# Patient Record
Sex: FEMALE | Race: ASIAN | URBAN - METROPOLITAN AREA
[De-identification: names, ages, dates, MRNs, and addresses within clinical notes are randomized per-mention and may not be internally consistent; named-entity substitution may affect disease eponyms.]

---

## 2021-03-17 ENCOUNTER — APPOINTMENT (RX ONLY)
Dept: URBAN - METROPOLITAN AREA CLINIC 28 | Facility: CLINIC | Age: 36
Setting detail: DERMATOLOGY
End: 2021-03-17

## 2021-03-17 DIAGNOSIS — L65.0 TELOGEN EFFLUVIUM: ICD-10-CM

## 2021-03-17 PROCEDURE — ? PRESCRIPTION

## 2021-03-17 PROCEDURE — ? PRESCRIPTION MEDICATION MANAGEMENT

## 2021-03-17 PROCEDURE — ? MEDICATION COUNSELING

## 2021-03-17 PROCEDURE — ? ORDER TESTS

## 2021-03-17 PROCEDURE — 99204 OFFICE O/P NEW MOD 45 MIN: CPT

## 2021-03-17 PROCEDURE — ? COUNSELING

## 2021-03-17 RX ORDER — CLOBETASOL PROPIONATE 0.5 MG/ML
SOLUTION TOPICAL QHS
Qty: 1 | Refills: 1 | Status: ERX | COMMUNITY
Start: 2021-03-17

## 2021-03-17 RX ADMIN — CLOBETASOL PROPIONATE: 0.5 SOLUTION TOPICAL at 00:00

## 2021-03-17 ASSESSMENT — LOCATION SIMPLE DESCRIPTION DERM: LOCATION SIMPLE: SCALP

## 2021-03-17 ASSESSMENT — LOCATION ZONE DERM: LOCATION ZONE: SCALP

## 2021-03-17 ASSESSMENT — LOCATION DETAILED DESCRIPTION DERM: LOCATION DETAILED: RIGHT SUPERIOR PARIETAL SCALP

## 2021-03-17 NOTE — PROCEDURE: MEDICATION COUNSELING
Xelemz Pregnancy And Lactation Text: This medication is Pregnancy Category D and is not considered safe during pregnancy.  The risk during breast feeding is also uncertain.

## 2021-03-17 NOTE — PROCEDURE: ORDER TESTS
Expected Date Of Service: 03/17/2021
Performing Laboratory: [id number]
Bill For Surgical Tray: no
Billing Type: Third-Party Bill

## 2021-03-17 NOTE — PROCEDURE: PRESCRIPTION MEDICATION MANAGEMENT
Detail Level: Zone
Initiate Treatment: clobetasol 0.05 % scalp solution: apply scattered drops to scalp qhs
Render In Strict Bullet Format?: No

## 2023-05-15 ENCOUNTER — APPOINTMENT (EMERGENCY)
Dept: RADIOLOGY | Facility: HOSPITAL | Age: 38
End: 2023-05-15
Payer: COMMERCIAL

## 2023-05-15 ENCOUNTER — HOSPITAL ENCOUNTER (EMERGENCY)
Facility: HOSPITAL | Age: 38
Discharge: HOME | End: 2023-05-15
Attending: STUDENT IN AN ORGANIZED HEALTH CARE EDUCATION/TRAINING PROGRAM
Payer: COMMERCIAL

## 2023-05-15 VITALS
TEMPERATURE: 98.9 F | SYSTOLIC BLOOD PRESSURE: 102 MMHG | DIASTOLIC BLOOD PRESSURE: 55 MMHG | RESPIRATION RATE: 16 BRPM | OXYGEN SATURATION: 99 % | HEART RATE: 79 BPM

## 2023-05-15 DIAGNOSIS — O20.0 ABORTION, THREATENED: Primary | ICD-10-CM

## 2023-05-15 LAB
ABO + RH BLD: NORMAL
ALBUMIN SERPL-MCNC: 4 G/DL (ref 3.4–5)
ALP SERPL-CCNC: 39 IU/L (ref 35–126)
ALT SERPL-CCNC: 14 IU/L (ref 11–54)
ANION GAP SERPL CALC-SCNC: 8 MEQ/L (ref 3–15)
AST SERPL-CCNC: 14 IU/L (ref 15–41)
BACTERIA URNS QL MICRO: ABNORMAL /HPF
BASOPHILS # BLD: 0.03 K/UL (ref 0.01–0.1)
BASOPHILS NFR BLD: 0.4 %
BILIRUB SERPL-MCNC: 0.5 MG/DL (ref 0.3–1.2)
BILIRUB UR QL STRIP.AUTO: NEGATIVE MG/DL
BLD GP AB SCN SERPL QL: NEGATIVE
BUN SERPL-MCNC: 11 MG/DL (ref 8–20)
CALCIUM SERPL-MCNC: 9.2 MG/DL (ref 8.9–10.3)
CHLORIDE SERPL-SCNC: 104 MEQ/L (ref 98–109)
CLARITY UR REFRACT.AUTO: CLEAR
CO2 SERPL-SCNC: 24 MEQ/L (ref 22–32)
COLOR UR AUTO: YELLOW
CREAT SERPL-MCNC: 0.6 MG/DL (ref 0.6–1.1)
D AG BLD QL: POSITIVE
DIFFERENTIAL METHOD BLD: NORMAL
EOSINOPHIL # BLD: 0.09 K/UL (ref 0.04–0.36)
EOSINOPHIL NFR BLD: 1.2 %
ERYTHROCYTE [DISTWIDTH] IN BLOOD BY AUTOMATED COUNT: 12.3 % (ref 11.7–14.4)
GFR SERPL CREATININE-BSD FRML MDRD: >60 ML/MIN/1.73M*2
GLUCOSE SERPL-MCNC: 117 MG/DL (ref 70–99)
GLUCOSE UR STRIP.AUTO-MCNC: NEGATIVE MG/DL
HCG SERPL-ACNC: 5308 IU/L (MIU/ML)
HCT VFR BLDCO AUTO: 37.3 % (ref 35–45)
HGB BLD-MCNC: 12.6 G/DL (ref 11.8–15.7)
HGB UR QL STRIP.AUTO: 3
HYALINE CASTS #/AREA URNS LPF: ABNORMAL /LPF
IMM GRANULOCYTES # BLD AUTO: 0.02 K/UL (ref 0–0.08)
IMM GRANULOCYTES NFR BLD AUTO: 0.3 %
KETONES UR STRIP.AUTO-MCNC: NEGATIVE MG/DL
LABORATORY COMMENT REPORT: NORMAL
LEUKOCYTE ESTERASE UR QL STRIP.AUTO: ABNORMAL
LYMPHOCYTES # BLD: 2.43 K/UL (ref 1.2–3.5)
LYMPHOCYTES NFR BLD: 31.8 %
MCH RBC QN AUTO: 30.9 PG (ref 28–33.2)
MCHC RBC AUTO-ENTMCNC: 33.8 G/DL (ref 32.2–35.5)
MCV RBC AUTO: 91.4 FL (ref 83–98)
MONOCYTES # BLD: 0.52 K/UL (ref 0.28–0.8)
MONOCYTES NFR BLD: 6.8 %
MUCOUS THREADS URNS QL MICRO: ABNORMAL /LPF
NEUTROPHILS # BLD: 4.55 K/UL (ref 1.7–7)
NEUTS SEG NFR BLD: 59.5 %
NITRITE UR QL STRIP.AUTO: NEGATIVE
NRBC BLD-RTO: 0 %
PDW BLD AUTO: 10.2 FL (ref 9.4–12.3)
PH UR STRIP.AUTO: 6.5 [PH]
PLATELET # BLD AUTO: 330 K/UL (ref 150–369)
POTASSIUM SERPL-SCNC: 3.6 MEQ/L (ref 3.6–5.1)
PROT SERPL-MCNC: 7.4 G/DL (ref 6–8.2)
PROT UR QL STRIP.AUTO: NEGATIVE
RBC # BLD AUTO: 4.08 M/UL (ref 3.93–5.22)
RBC #/AREA URNS HPF: ABNORMAL /HPF
SODIUM SERPL-SCNC: 136 MEQ/L (ref 136–144)
SP GR UR REFRACT.AUTO: 1.02
SPECIMEN EXP DATE BLD: NORMAL
SQUAMOUS URNS QL MICRO: ABNORMAL /HPF
UROBILINOGEN UR STRIP-ACNC: 0.2 EU/DL
WBC # BLD AUTO: 7.64 K/UL (ref 3.8–10.5)
WBC #/AREA URNS HPF: ABNORMAL /HPF

## 2023-05-15 PROCEDURE — 36415 COLL VENOUS BLD VENIPUNCTURE: CPT

## 2023-05-15 PROCEDURE — 80053 COMPREHEN METABOLIC PANEL: CPT | Performed by: STUDENT IN AN ORGANIZED HEALTH CARE EDUCATION/TRAINING PROGRAM

## 2023-05-15 PROCEDURE — 93975 VASCULAR STUDY: CPT

## 2023-05-15 PROCEDURE — 85025 COMPLETE CBC W/AUTO DIFF WBC: CPT

## 2023-05-15 PROCEDURE — 81001 URINALYSIS AUTO W/SCOPE: CPT

## 2023-05-15 PROCEDURE — 76817 TRANSVAGINAL US OBSTETRIC: CPT

## 2023-05-15 PROCEDURE — 76815 OB US LIMITED FETUS(S): CPT

## 2023-05-15 PROCEDURE — 86901 BLOOD TYPING SEROLOGIC RH(D): CPT

## 2023-05-15 PROCEDURE — 84702 CHORIONIC GONADOTROPIN TEST: CPT | Performed by: STUDENT IN AN ORGANIZED HEALTH CARE EDUCATION/TRAINING PROGRAM

## 2023-05-15 PROCEDURE — 99284 EMERGENCY DEPT VISIT MOD MDM: CPT

## 2023-05-15 PROCEDURE — 85025 COMPLETE CBC W/AUTO DIFF WBC: CPT | Performed by: STUDENT IN AN ORGANIZED HEALTH CARE EDUCATION/TRAINING PROGRAM

## 2023-05-15 ASSESSMENT — ENCOUNTER SYMPTOMS
BACK PAIN: 1
PALPITATIONS: 0
NAUSEA: 0
FREQUENCY: 0
FLANK PAIN: 0
FEVER: 0
HEMATURIA: 0
ABDOMINAL PAIN: 1
LIGHT-HEADEDNESS: 0
SHORTNESS OF BREATH: 0
VOMITING: 0
DYSURIA: 0
WEAKNESS: 0
DIZZINESS: 0
CHILLS: 0

## 2023-05-16 NOTE — ED PROVIDER NOTES
Emergency Medicine Note  HPI   HISTORY OF PRESENT ILLNESS     Patient is a 37-year-old  female who is currently 10 weeks pregnant presenting for vaginal bleeding and lower abdominal cramping.  Patient reports that for the past 3 days, she has noticed occasional brown spotting in her underwear that is now progressed to bright red blood without clots.  She denies saturation of any pads.  With the spotting, she also notes lower abdominal cramping that has been constant over the past 3 days that radiates to her lower back.  She reports that she was told by her OB/GYN that brown spotting can occur but the patient became more concerned that the color of the spotting has become more red.  She currently sees OB/GYN at [facility].  She states that she had an ultrasound last week which was normal.  Her last menstrual period was .  She denies any fever, chills, chest pain, shortness of breath, nausea, vomiting, dizziness, lightheadedness, palpitations, weakness, syncope.            Patient History   PAST HISTORY     Reviewed from Nursing Triage:       History reviewed. No pertinent past medical history.    No past surgical history on file.    History reviewed. No pertinent family history.           Review of Systems   REVIEW OF SYSTEMS     Review of Systems   Constitutional: Negative for chills and fever.   Respiratory: Negative for shortness of breath.    Cardiovascular: Negative for chest pain and palpitations.   Gastrointestinal: Positive for abdominal pain (lower abdominal cramping). Negative for nausea and vomiting.   Genitourinary: Positive for vaginal bleeding. Negative for dysuria, flank pain, frequency, hematuria, pelvic pain and urgency.   Musculoskeletal: Positive for back pain (radiation of abdominal pain).   Neurological: Negative for dizziness, syncope, weakness and light-headedness.         VITALS     ED Vitals    Date/Time Temp Pulse Resp BP SpO2 [facility]   05/15/23 2157 -- 79  16 102/55 99 %    05/15/23 1852 37.2 °C (98.9 °F) 99 18 106/60 100 % GC        Pulse Ox %: 100 % (05/15/23 1852)  Pulse Ox Interpretation: Normal (05/15/23 1852)           Physical Exam   PHYSICAL EXAM     Physical Exam  Exam conducted with a chaperone present.   Constitutional:       General: She is not in acute distress.     Appearance: Normal appearance. She is not ill-appearing, toxic-appearing or diaphoretic.   HENT:      Head: Normocephalic and atraumatic.      Mouth/Throat:      Mouth: Mucous membranes are moist.      Pharynx: Oropharynx is clear.   Eyes:      Extraocular Movements: Extraocular movements intact.      Conjunctiva/sclera: Conjunctivae normal.      Pupils: Pupils are equal, round, and reactive to light.   Cardiovascular:      Rate and Rhythm: Normal rate and regular rhythm.   Pulmonary:      Effort: Pulmonary effort is normal. No respiratory distress.      Breath sounds: Normal breath sounds. No wheezing, rhonchi or rales.   Abdominal:      General: Abdomen is flat.      Palpations: Abdomen is soft.      Tenderness: There is abdominal tenderness (mild) in the right lower quadrant, suprapubic area and left lower quadrant. There is no right CVA tenderness, left CVA tenderness, guarding or rebound. Negative signs include Vazquez's sign, Rovsing's sign and McBurney's sign.   Genitourinary:     Vagina: Bleeding present. No vaginal discharge, erythema or tenderness.      Cervix: Cervical bleeding present. No cervical motion tenderness, discharge, friability, lesion or erythema.      Uterus: Not tender.       Adnexa:         Right: No tenderness.          Left: No tenderness.        Comments: Scant amount of old blood present within vaginal vault, no active bleeding; closed os  Musculoskeletal:      Cervical back: Normal range of motion and neck supple.   Skin:     General: Skin is warm and dry.      Capillary Refill: Capillary refill takes less than 2 seconds.   Neurological:      Mental Status: She  is alert and oriented to person, place, and time.           PROCEDURES     Procedures     DATA     Results     Procedure Component Value Units Date/Time    UA with reflex culture [[id number]]  (Abnormal) Collected: 05/15/23 2104    Specimen: Urine, Clean Catch Updated: 05/15/23 2124    Narrative:      The following orders were created for panel order UA with reflex culture.  Procedure                               Abnormality         Status                     ---------                               -----------         ------                     UA Reflex to Culture (Ma...[[id number]]  Abnormal            Final result               UA Microscopic[[id number]]               Abnormal            Final result                 Please view results for these tests on the individual orders.    UA Microscopic [[id number]]  (Abnormal) Collected: 05/15/23 2104    Specimen: Urine, Clean Catch Updated: 05/15/23 2124     RBC, Urine 5 TO 9 /HPF      WBC, Urine 0 TO 3 /HPF      Squamous Epithelial Rare /hpf      Hyaline Cast None Seen /lpf      Bacteria, Urine Rare /HPF      Mucus Rare /LPF     UA Reflex to Culture (Macroscopic) [[id number]]  (Abnormal) Collected: 05/15/23 2104    Specimen: Urine, Clean Catch Updated: 05/15/23 2122     Color, Urine Yellow     Clarity, Urine Clear     Specific Gravity, Urine 1.020     pH, Urine 6.5     Leukocyte Esterase Trace     Nitrite, Urine Negative     Protein, Urine Negative     Glucose, Urine Negative mg/dL      Ketones, Urine Negative mg/dL      Urobilinogen, Urine 0.2 EU/dL      Bilirubin, Urine Negative mg/dL      Blood, Urine +3     Comment: The sensitivity of the occult blood test is equivalent to approximately 4 intact RBC/HPF.       Type and screen (only if bleeding) [[id number]] Collected: 05/15/23 1856    Specimen: Blood, Venous Updated: 05/15/23 2009     Specimen Expiration 05/18/2023     Antibody Screen Negative     ABO A     Rh Factor Positive     History Check No type on file    [facility]  Serum, Quant [[id number]]  (Abnormal) Collected: 05/15/23 1856    Specimen: Blood, Venous Updated: 05/15/23 2002     hCG Quant 5,308.0 IU/L (mIU/mL)      Comment: Approx. Gestational Age   Approx. hCG Range         (Weeks)                  (IU/L)          0.2-1                    5-50            1-2                               2-3                  100-5000            3-4                  500-10,000            4-5                 1000-50,000            5-6               10,000-100,000            6-8               15,000-200,000            8-12              10,000-100,000       Comprehensive metabolic panel [[id number]]  (Abnormal) Collected: 05/15/23 1856    Specimen: Blood, Venous Updated: 05/15/23 1957     Sodium 136 mEQ/L      Potassium 3.6 mEQ/L      Comment: Results obtained on plasma. Plasma Potassium values may be up to 0.4 mEQ/L less than serum values. The differences may be greater for patients with high platelet or white cell counts.        Chloride 104 mEQ/L      CO2 24 mEQ/L      BUN 11 mg/dL      Creatinine 0.6 mg/dL      Glucose 117 mg/dL      Calcium 9.2 mg/dL      AST (SGOT) 14 IU/L      ALT (SGPT) 14 IU/L      Alkaline Phosphatase 39 IU/L      Total Protein 7.4 g/dL      Comment: Test performed on plasma which typically contains approximately 0.4 g/dL more protein than serum.        Albumin 4.0 g/dL      Bilirubin, Total 0.5 mg/dL      eGFR >60.0 mL/min/1.73m*2      Anion Gap 8 mEQ/L     CBC and differential [[id number]] Collected: 05/15/23 1856    Specimen: Blood, Venous Updated: 05/15/23 1904     WBC 7.64 K/uL      RBC 4.08 M/uL      Hemoglobin 12.6 g/dL      Hematocrit 37.3 %      MCV 91.4 fL      MCH 30.9 pg      MCHC 33.8 g/dL      RDW 12.3 %      Platelets 330 K/uL      MPV 10.2 fL      Differential Type Auto     nRBC 0.0 %      Immature Granulocytes 0.3 %      Neutrophils 59.5 %      Lymphocytes 31.8 %      Monocytes 6.8 %      Eosinophils 1.2 %      Basophils 0.4 %      Immature  Granulocytes, Absolute 0.02 K/uL      Neutrophils, Absolute 4.55 K/uL      Lymphocytes, Absolute 2.43 K/uL      Monocytes, Absolute 0.52 K/uL      Eosinophils, Absolute 0.09 K/uL      Basophils, Absolute 0.03 K/uL           Imaging Results          ULTRASOUND DOPPLER (Final result)  Result time 05/15/23 21:19:32    Final result                 Impression:    IMPRESSION:  1.  Gestational sac measures 3 cm which corresponds to a gestational age of  seven weeks and six days.  There is a possible fetal pole and yolk sac noted  with the crown-rump length measuring up to 0.3 cm which corresponds to  gestational age of five weeks, six days.  This represents a discrepancy with the  patient's LMP with gestational age of 10 weeks, six days.  There is also blood  products noted in the lower uterine segment.  Findings could be secondary to a  spontaneous , or blighted ovum.  Short interval follow-up serial beta  hCG levels as well as follow-up pelvic ultrasound is recommended.  2.  No ovarian torsion.  3.  Right ovarian corpus luteum measures 1.6 cm.               Narrative:    CLINICAL HISTORY: Pregnant female presents with vaginal bleeding.  By LMP,  patient should be 10 weeks and six days.    COMMENT: Real-time ultrasound examination of the gravid uterus was performed.  There are no prior studies available for comparison.    Gravid uterus measures 8.4 x 6.6 x 7.3 cm.  Gestational sac is noted measuring  up to 3 cm which corresponds to gestational age of seven weeks and six days.  There is a questionable crown-rump noted measuring length noted measuring up to  0.3 cm which may correspond to gestational age of five weeks, six days.  There  is a possible yolk sac noted measuring up to 0.1 cm.  No fetal cardiac activity  is documented.  Right ovary measures 3.5 x 1.9 x 1.9 cm.  No right ovarian  torsion.  Right ovarian corpus luteum is noted measuring 1.6 x 1.4 x 1.6 cm.  Left ovary measures 2.4 x 1.7 x 1.4 cm.  No left  ovarian torsion.  There are  blood products noted in the lower uterine segment.  No free fluid in the pelvis.                               ULTRASOUND PREGNANCY < 14 WEEKS TRANSABDOMINAL LIMITED (Final result)  Result time 05/15/23 21:19:32    Final result                 Impression:    IMPRESSION:  1.  Gestational sac measures 3 cm which corresponds to a gestational age of  seven weeks and six days.  There is a possible fetal pole and yolk sac noted  with the crown-rump length measuring up to 0.3 cm which corresponds to  gestational age of five weeks, six days.  This represents a discrepancy with the  patient's LMP with gestational age of 10 weeks, six days.  There is also blood  products noted in the lower uterine segment.  Findings could be secondary to a  spontaneous , or blighted ovum.  Short interval follow-up serial beta  hCG levels as well as follow-up pelvic ultrasound is recommended.  2.  No ovarian torsion.  3.  Right ovarian corpus luteum measures 1.6 cm.               Narrative:    CLINICAL HISTORY: Pregnant female presents with vaginal bleeding.  By LMP,  patient should be 10 weeks and six days.    COMMENT: Real-time ultrasound examination of the gravid uterus was performed.  There are no prior studies available for comparison.    Gravid uterus measures 8.4 x 6.6 x 7.3 cm.  Gestational sac is noted measuring  up to 3 cm which corresponds to gestational age of seven weeks and six days.  There is a questionable crown-rump noted measuring length noted measuring up to  0.3 cm which may correspond to gestational age of five weeks, six days.  There  is a possible yolk sac noted measuring up to 0.1 cm.  No fetal cardiac activity  is documented.  Right ovary measures 3.5 x 1.9 x 1.9 cm.  No right ovarian  torsion.  Right ovarian corpus luteum is noted measuring 1.6 x 1.4 x 1.6 cm.  Left ovary measures 2.4 x 1.7 x 1.4 cm.  No left ovarian torsion.  There are  blood products noted in the lower uterine  segment.  No free fluid in the pelvis.                               ULTRASOUND PREGNANCY TRANSVAGINAL ONLY (Final result)  Result time 05/15/23 21:19:32    Final result                 Impression:    IMPRESSION:  1.  Gestational sac measures 3 cm which corresponds to a gestational age of  seven weeks and six days.  There is a possible fetal pole and yolk sac noted  with the crown-rump length measuring up to 0.3 cm which corresponds to  gestational age of five weeks, six days.  This represents a discrepancy with the  patient's LMP with gestational age of 10 weeks, six days.  There is also blood  products noted in the lower uterine segment.  Findings could be secondary to a  spontaneous , or blighted ovum.  Short interval follow-up serial beta  hCG levels as well as follow-up pelvic ultrasound is recommended.  2.  No ovarian torsion.  3.  Right ovarian corpus luteum measures 1.6 cm.               Narrative:    CLINICAL HISTORY: Pregnant female presents with vaginal bleeding.  By LMP,  patient should be 10 weeks and six days.    COMMENT: Real-time ultrasound examination of the gravid uterus was performed.  There are no prior studies available for comparison.    Gravid uterus measures 8.4 x 6.6 x 7.3 cm.  Gestational sac is noted measuring  up to 3 cm which corresponds to gestational age of seven weeks and six days.  There is a questionable crown-rump noted measuring length noted measuring up to  0.3 cm which may correspond to gestational age of five weeks, six days.  There  is a possible yolk sac noted measuring up to 0.1 cm.  No fetal cardiac activity  is documented.  Right ovary measures 3.5 x 1.9 x 1.9 cm.  No right ovarian  torsion.  Right ovarian corpus luteum is noted measuring 1.6 x 1.4 x 1.6 cm.  Left ovary measures 2.4 x 1.7 x 1.4 cm.  No left ovarian torsion.  There are  blood products noted in the lower uterine segment.  No free fluid in the pelvis.                                No orders to  display       Scoring tools                                  ED Course & MDM   MDM / ED COURSE / CLINICAL IMPRESSION / DISPO     Medical Decision Making  , threatened: acute illness or injury  Amount and/or Complexity of Data Reviewed  Independent Historian: spouse  Labs: ordered. Decision-making details documented in ED Course.  Radiology: ordered. Decision-making details documented in ED Course.          ED Course as of 05/16/23 0114   Mon May 15, 2023   1952 Hemoglobin: 12.6 [AB]    Rh Factor: Positive [AB]    hCG Quant(!): 5,308.0 [AB]    ULTRASOUND PREGNANCY < 14 WEEKS TRANSABDOMINAL LIMITED  IMPRESSION:  1.  Gestational sac measures 3 cm which corresponds to a gestational age of  seven weeks and six days.  There is a possible fetal pole and yolk sac noted  with the crown-rump length measuring up to 0.3 cm which corresponds to  gestational age of five weeks, six days.  This represents a discrepancy with the  patient's LMP with gestational age of 10 weeks, six days.  There is also blood  products noted in the lower uterine segment.  Findings could be secondary to a  spontaneous , or blighted ovum.  Short interval follow-up serial beta  hCG levels as well as follow-up pelvic ultrasound is recommended.  2.  No ovarian torsion.  3.  Right ovarian corpus luteum measures 1.6 cm. [AB]   Tue May 16, 2023   0111 Reviewed lab and imaging results with patient.  Patient hemodynamically stable with stable hemoglobin.  She will follow-up outpatient with OB/GYN for serial hCG levels in addition to repeat pelvic ultrasound outpatient along with following up with her PCP.  Strict return precautions discussed.  Patient and  expressed understanding and are agreeable with plan. [AB]      ED Course User Index  [AB] Macie Connor PA C     Clinical Impression      , threatened     _________________     ED Disposition   Discharge                   Macie Connor PA  C  05/16/23 0112       Macie Connor PA C  05/16/23 0114

## 2023-05-16 NOTE — DISCHARGE INSTRUCTIONS
Follow-up with OB/GYN regarding recent ER visit and for continued care.  Recommend beta hCG monitoring outpatient in addition to repeat ultrasound.    Follow-up with PCP regarding recent ER visit and for continued care.    Return to the ER for any new or worsening symptoms such as but not limited to fever, worsening pain, intractable nausea/vomiting, dizziness/lightheadedness, chest pain, shortness of breath, vaginal bleeding saturating a pad every 1-2 hours, or any other concerns.

## 2023-05-17 ENCOUNTER — APPOINTMENT (EMERGENCY)
Dept: RADIOLOGY | Facility: HOSPITAL | Age: 38
End: 2023-05-17
Payer: COMMERCIAL

## 2023-05-17 ENCOUNTER — HOSPITAL ENCOUNTER (OUTPATIENT)
Facility: HOSPITAL | Age: 38
Discharge: HOME | End: 2023-05-17
Attending: EMERGENCY MEDICINE | Admitting: STUDENT IN AN ORGANIZED HEALTH CARE EDUCATION/TRAINING PROGRAM
Payer: COMMERCIAL

## 2023-05-17 ENCOUNTER — ANESTHESIA (OUTPATIENT)
Dept: OPERATING ROOM | Facility: HOSPITAL | Age: 38
End: 2023-05-17
Payer: COMMERCIAL

## 2023-05-17 ENCOUNTER — ANESTHESIA EVENT (OUTPATIENT)
Dept: OPERATING ROOM | Facility: HOSPITAL | Age: 38
End: 2023-05-17
Payer: COMMERCIAL

## 2023-05-17 VITALS
DIASTOLIC BLOOD PRESSURE: 64 MMHG | HEART RATE: 104 BPM | TEMPERATURE: 97.2 F | BODY MASS INDEX: 23.32 KG/M2 | RESPIRATION RATE: 25 BRPM | HEIGHT: 65 IN | WEIGHT: 140 LBS | SYSTOLIC BLOOD PRESSURE: 91 MMHG | OXYGEN SATURATION: 98 %

## 2023-05-17 DIAGNOSIS — O03.1 INCOMPLETE SPONTANEOUS ABORTION WITH DELAYED OR EXCESSIVE HEMORRHAGE: ICD-10-CM

## 2023-05-17 DIAGNOSIS — O03.9 MISCARRIAGE: Primary | ICD-10-CM

## 2023-05-17 DIAGNOSIS — R55 VASOVAGAL SYNCOPE: ICD-10-CM

## 2023-05-17 DIAGNOSIS — O03.9 MISCARRIAGE: ICD-10-CM

## 2023-05-17 DIAGNOSIS — D64.9 ANEMIA, UNSPECIFIED TYPE: ICD-10-CM

## 2023-05-17 LAB
ABO + RH BLD: NORMAL
ALBUMIN SERPL-MCNC: 2.9 G/DL (ref 3.4–5)
ALP SERPL-CCNC: 34 IU/L (ref 35–126)
ALT SERPL-CCNC: 10 IU/L (ref 11–54)
ANION GAP SERPL CALC-SCNC: 7 MEQ/L (ref 3–15)
ANION GAP SERPL CALC-SCNC: 8 MEQ/L (ref 3–15)
APTT PPP: 26 SEC (ref 23–35)
AST SERPL-CCNC: 13 IU/L (ref 15–41)
BASOPHILS # BLD: 0.02 K/UL (ref 0.01–0.1)
BASOPHILS # BLD: 0.03 K/UL (ref 0.01–0.1)
BASOPHILS NFR BLD: 0.2 %
BASOPHILS NFR BLD: 0.2 %
BILIRUB SERPL-MCNC: 0.7 MG/DL (ref 0.3–1.2)
BLD GP AB SCN SERPL QL: NEGATIVE
BUN SERPL-MCNC: 10 MG/DL (ref 8–20)
BUN SERPL-MCNC: 13 MG/DL (ref 8–20)
CALCIUM SERPL-MCNC: 6.9 MG/DL (ref 8.9–10.3)
CALCIUM SERPL-MCNC: 9.4 MG/DL (ref 8.9–10.3)
CHLORIDE SERPL-SCNC: 103 MEQ/L (ref 98–109)
CHLORIDE SERPL-SCNC: 110 MEQ/L (ref 98–109)
CO2 SERPL-SCNC: 20 MEQ/L (ref 22–32)
CO2 SERPL-SCNC: 25 MEQ/L (ref 22–32)
CREAT SERPL-MCNC: 0.3 MG/DL (ref 0.6–1.1)
CREAT SERPL-MCNC: 0.5 MG/DL (ref 0.6–1.1)
D AG BLD QL: POSITIVE
DIFFERENTIAL METHOD BLD: ABNORMAL
DIFFERENTIAL METHOD BLD: ABNORMAL
EOSINOPHIL # BLD: 0 K/UL (ref 0.04–0.36)
EOSINOPHIL # BLD: 0.08 K/UL (ref 0.04–0.36)
EOSINOPHIL NFR BLD: 0 %
EOSINOPHIL NFR BLD: 0.8 %
ERYTHROCYTE [DISTWIDTH] IN BLOOD BY AUTOMATED COUNT: 12.3 % (ref 11.7–14.4)
ERYTHROCYTE [DISTWIDTH] IN BLOOD BY AUTOMATED COUNT: 13.1 % (ref 11.7–14.4)
GFR SERPL CREATININE-BSD FRML MDRD: >60 ML/MIN/1.73M*2
GFR SERPL CREATININE-BSD FRML MDRD: >60 ML/MIN/1.73M*2
GLUCOSE SERPL-MCNC: 97 MG/DL (ref 70–99)
GLUCOSE SERPL-MCNC: 98 MG/DL (ref 70–99)
HCG SERPL-ACNC: 3603 IU/L (MIU/ML)
HCT VFR BLDCO AUTO: 29.1 % (ref 35–45)
HCT VFR BLDCO AUTO: 34.8 % (ref 35–45)
HCT VFR BLDCO AUTO: 38 % (ref 35–45)
HGB BLD-MCNC: 11.5 G/DL (ref 11.8–15.7)
HGB BLD-MCNC: 12.6 G/DL (ref 11.8–15.7)
HGB BLD-MCNC: 9.8 G/DL (ref 11.8–15.7)
IMM GRANULOCYTES # BLD AUTO: 0.04 K/UL (ref 0–0.08)
IMM GRANULOCYTES # BLD AUTO: 0.07 K/UL (ref 0–0.08)
IMM GRANULOCYTES NFR BLD AUTO: 0.4 %
IMM GRANULOCYTES NFR BLD AUTO: 0.5 %
INR PPP: 1.2
ISBT CODE: 600
ISBT CODE: 6200
ISBT CODE: 7300
ISBT CODE: 9500
LABORATORY COMMENT REPORT: NORMAL
LYMPHOCYTES # BLD: 1.17 K/UL (ref 1.2–3.5)
LYMPHOCYTES # BLD: 2.15 K/UL (ref 1.2–3.5)
LYMPHOCYTES NFR BLD: 21.7 %
LYMPHOCYTES NFR BLD: 8.2 %
MCH RBC QN AUTO: 30.3 PG (ref 28–33.2)
MCH RBC QN AUTO: 30.6 PG (ref 28–33.2)
MCHC RBC AUTO-ENTMCNC: 33 G/DL (ref 32.2–35.5)
MCHC RBC AUTO-ENTMCNC: 33.2 G/DL (ref 32.2–35.5)
MCV RBC AUTO: 91.8 FL (ref 83–98)
MCV RBC AUTO: 92.2 FL (ref 83–98)
MONOCYTES # BLD: 0.52 K/UL (ref 0.28–0.8)
MONOCYTES # BLD: 0.56 K/UL (ref 0.28–0.8)
MONOCYTES NFR BLD: 3.6 %
MONOCYTES NFR BLD: 5.7 %
NEUTROPHILS # BLD: 12.51 K/UL (ref 1.7–7)
NEUTROPHILS # BLD: 7.06 K/UL (ref 1.7–7)
NEUTS SEG NFR BLD: 71.2 %
NEUTS SEG NFR BLD: 87.5 %
NRBC BLD-RTO: 0 %
NRBC BLD-RTO: 0 %
PDW BLD AUTO: 10.3 FL (ref 9.4–12.3)
PDW BLD AUTO: 10.3 FL (ref 9.4–12.3)
PLATELET # BLD AUTO: 212 K/UL (ref 150–369)
PLATELET # BLD AUTO: 310 K/UL (ref 150–369)
POTASSIUM SERPL-SCNC: 3.7 MEQ/L (ref 3.6–5.1)
POTASSIUM SERPL-SCNC: 5.9 MEQ/L (ref 3.6–5.1)
PRODUCT CODE: NORMAL
PRODUCT STATUS: NORMAL
PROT SERPL-MCNC: 5.2 G/DL (ref 6–8.2)
PROTHROMBIN TIME: 15.2 SEC (ref 12.2–14.5)
RBC # BLD AUTO: 3.79 M/UL (ref 3.93–5.22)
RBC # BLD AUTO: 4.12 M/UL (ref 3.93–5.22)
SODIUM SERPL-SCNC: 136 MEQ/L (ref 136–144)
SODIUM SERPL-SCNC: 137 MEQ/L (ref 136–144)
SPECIMEN EXP DATE BLD: NORMAL
UNIT ABO: NORMAL
UNIT ID: NORMAL
UNIT RH: NEGATIVE
UNIT RH: POSITIVE
WBC # BLD AUTO: 14.3 K/UL (ref 3.8–10.5)
WBC # BLD AUTO: 9.91 K/UL (ref 3.8–10.5)

## 2023-05-17 PROCEDURE — 71000012 HC PACU PHASE 2 EA ADDL MIN: Performed by: STUDENT IN AN ORGANIZED HEALTH CARE EDUCATION/TRAINING PROGRAM

## 2023-05-17 PROCEDURE — 25800000 HC PHARMACY IV SOLUTIONS: Performed by: ANESTHESIOLOGY

## 2023-05-17 PROCEDURE — 36000012 HC OR LEVEL 2 EA ADDL MIN: Performed by: STUDENT IN AN ORGANIZED HEALTH CARE EDUCATION/TRAINING PROGRAM

## 2023-05-17 PROCEDURE — 71000001 HC PACU PHASE 1 INITIAL 30MIN: Performed by: STUDENT IN AN ORGANIZED HEALTH CARE EDUCATION/TRAINING PROGRAM

## 2023-05-17 PROCEDURE — 84702 CHORIONIC GONADOTROPIN TEST: CPT | Performed by: PHYSICIAN ASSISTANT

## 2023-05-17 PROCEDURE — 86920 COMPATIBILITY TEST SPIN: CPT | Mod: 91

## 2023-05-17 PROCEDURE — 88305 TISSUE EXAM BY PATHOLOGIST: CPT | Performed by: STUDENT IN AN ORGANIZED HEALTH CARE EDUCATION/TRAINING PROGRAM

## 2023-05-17 PROCEDURE — 36000002 HC OR LEVEL 2 INITIAL 30MIN: Performed by: STUDENT IN AN ORGANIZED HEALTH CARE EDUCATION/TRAINING PROGRAM

## 2023-05-17 PROCEDURE — 25000000 HC PHARMACY GENERAL: Performed by: OBSTETRICS & GYNECOLOGY

## 2023-05-17 PROCEDURE — 80053 COMPREHEN METABOLIC PANEL: CPT | Performed by: ANESTHESIOLOGY

## 2023-05-17 PROCEDURE — 25000000 HC PHARMACY GENERAL: Performed by: ANESTHESIOLOGY

## 2023-05-17 PROCEDURE — 85018 HEMOGLOBIN: CPT | Mod: 59 | Performed by: PHYSICIAN ASSISTANT

## 2023-05-17 PROCEDURE — 86850 RBC ANTIBODY SCREEN: CPT

## 2023-05-17 PROCEDURE — 71000002 HC PACU PHASE 2 INITIAL 30MIN: Performed by: STUDENT IN AN ORGANIZED HEALTH CARE EDUCATION/TRAINING PROGRAM

## 2023-05-17 PROCEDURE — 37000001 HC ANESTHESIA GENERAL: Performed by: STUDENT IN AN ORGANIZED HEALTH CARE EDUCATION/TRAINING PROGRAM

## 2023-05-17 PROCEDURE — 10D17ZZ EXTRACTION OF PRODUCTS OF CONCEPTION, RETAINED, VIA NATURAL OR ARTIFICIAL OPENING: ICD-10-PCS | Performed by: STUDENT IN AN ORGANIZED HEALTH CARE EDUCATION/TRAINING PROGRAM

## 2023-05-17 PROCEDURE — 63700000 HC SELF-ADMINISTRABLE DRUG: Performed by: STUDENT IN AN ORGANIZED HEALTH CARE EDUCATION/TRAINING PROGRAM

## 2023-05-17 PROCEDURE — 36415 COLL VENOUS BLD VENIPUNCTURE: CPT | Performed by: PHYSICIAN ASSISTANT

## 2023-05-17 PROCEDURE — 85730 THROMBOPLASTIN TIME PARTIAL: CPT | Performed by: ANESTHESIOLOGY

## 2023-05-17 PROCEDURE — 63600000 HC DRUGS/DETAIL CODE: Performed by: ANESTHESIOLOGY

## 2023-05-17 PROCEDURE — 36430 TRANSFUSION BLD/BLD COMPNT: CPT

## 2023-05-17 PROCEDURE — 63700000 HC SELF-ADMINISTRABLE DRUG: Performed by: EMERGENCY MEDICINE

## 2023-05-17 PROCEDURE — 25800000 HC PHARMACY IV SOLUTIONS: Performed by: EMERGENCY MEDICINE

## 2023-05-17 PROCEDURE — 85025 COMPLETE CBC W/AUTO DIFF WBC: CPT | Performed by: PHYSICIAN ASSISTANT

## 2023-05-17 PROCEDURE — 99285 EMERGENCY DEPT VISIT HI MDM: CPT | Mod: 25

## 2023-05-17 PROCEDURE — 25800000 HC PHARMACY IV SOLUTIONS: Performed by: OBSTETRICS & GYNECOLOGY

## 2023-05-17 PROCEDURE — 85025 COMPLETE CBC W/AUTO DIFF WBC: CPT | Performed by: ANESTHESIOLOGY

## 2023-05-17 PROCEDURE — P9016 RBC LEUKOCYTES REDUCED: HCPCS

## 2023-05-17 PROCEDURE — 25800000 HC PHARMACY IV SOLUTIONS: Performed by: PHYSICIAN ASSISTANT

## 2023-05-17 PROCEDURE — 85610 PROTHROMBIN TIME: CPT | Performed by: ANESTHESIOLOGY

## 2023-05-17 PROCEDURE — 93005 ELECTROCARDIOGRAM TRACING: CPT | Mod: 59

## 2023-05-17 PROCEDURE — 71000011 HC PACU PHASE 1 EA ADDL MIN: Performed by: STUDENT IN AN ORGANIZED HEALTH CARE EDUCATION/TRAINING PROGRAM

## 2023-05-17 PROCEDURE — 76817 TRANSVAGINAL US OBSTETRIC: CPT

## 2023-05-17 PROCEDURE — 80048 BASIC METABOLIC PNL TOTAL CA: CPT | Performed by: PHYSICIAN ASSISTANT

## 2023-05-17 RX ORDER — DEXTROSE 40 %
15-30 GEL (GRAM) ORAL AS NEEDED
Status: DISCONTINUED | OUTPATIENT
Start: 2023-05-17 | End: 2023-05-17 | Stop reason: HOSPADM

## 2023-05-17 RX ORDER — SODIUM CHLORIDE 9 MG/ML
5 INJECTION, SOLUTION INTRAVENOUS AS NEEDED
Status: DISCONTINUED | OUTPATIENT
Start: 2023-05-17 | End: 2023-05-17 | Stop reason: HOSPADM

## 2023-05-17 RX ORDER — SODIUM CHLORIDE, SODIUM LACTATE, POTASSIUM CHLORIDE, CALCIUM CHLORIDE 600; 310; 30; 20 MG/100ML; MG/100ML; MG/100ML; MG/100ML
INJECTION, SOLUTION INTRAVENOUS CONTINUOUS
Status: CANCELLED | OUTPATIENT
Start: 2023-05-17 | End: 2023-05-24

## 2023-05-17 RX ORDER — CALCIUM CARB/VITAMIN D3/VIT K1 500-500-40
125 TABLET,CHEWABLE ORAL
COMMUNITY
Start: 2023-05-04 | End: 2024-02-24 | Stop reason: ENTERED-IN-ERROR

## 2023-05-17 RX ORDER — ROCURONIUM BROMIDE 10 MG/ML
INJECTION, SOLUTION INTRAVENOUS AS NEEDED
Status: DISCONTINUED | OUTPATIENT
Start: 2023-05-17 | End: 2023-05-17 | Stop reason: SURG

## 2023-05-17 RX ORDER — FENTANYL CITRATE 50 UG/ML
50 INJECTION, SOLUTION INTRAMUSCULAR; INTRAVENOUS EVERY 5 MIN PRN
Status: DISCONTINUED | OUTPATIENT
Start: 2023-05-17 | End: 2023-05-17 | Stop reason: HOSPADM

## 2023-05-17 RX ORDER — DEXAMETHASONE SODIUM PHOSPHATE 4 MG/ML
INJECTION, SOLUTION INTRA-ARTICULAR; INTRALESIONAL; INTRAMUSCULAR; INTRAVENOUS; SOFT TISSUE AS NEEDED
Status: DISCONTINUED | OUTPATIENT
Start: 2023-05-17 | End: 2023-05-17 | Stop reason: SURG

## 2023-05-17 RX ORDER — SODIUM CHLORIDE, SODIUM GLUCONATE, SODIUM ACETATE, POTASSIUM CHLORIDE AND MAGNESIUM CHLORIDE 30; 37; 368; 526; 502 MG/100ML; MG/100ML; MG/100ML; MG/100ML; MG/100ML
INJECTION, SOLUTION INTRAVENOUS CONTINUOUS PRN
Status: DISCONTINUED | OUTPATIENT
Start: 2023-05-17 | End: 2023-05-17 | Stop reason: SURG

## 2023-05-17 RX ORDER — SODIUM CHLORIDE, SODIUM GLUCONATE, SODIUM ACETATE, POTASSIUM CHLORIDE AND MAGNESIUM CHLORIDE 30; 37; 368; 526; 502 MG/100ML; MG/100ML; MG/100ML; MG/100ML; MG/100ML
125 INJECTION, SOLUTION INTRAVENOUS CONTINUOUS
Status: DISCONTINUED | OUTPATIENT
Start: 2023-05-17 | End: 2023-05-17 | Stop reason: HOSPADM

## 2023-05-17 RX ORDER — ACETAMINOPHEN 325 MG/1
650 TABLET ORAL ONCE
Status: COMPLETED | OUTPATIENT
Start: 2023-05-17 | End: 2023-05-17

## 2023-05-17 RX ORDER — DEXTROSE 50 % IN WATER (D50W) INTRAVENOUS SYRINGE
25 AS NEEDED
Status: DISCONTINUED | OUTPATIENT
Start: 2023-05-17 | End: 2023-05-17 | Stop reason: HOSPADM

## 2023-05-17 RX ORDER — MISOPROSTOL 100 UG/1
TABLET ORAL
Status: DISCONTINUED | OUTPATIENT
Start: 2023-05-17 | End: 2023-05-17 | Stop reason: HOSPADM

## 2023-05-17 RX ORDER — HYDROMORPHONE HYDROCHLORIDE 1 MG/ML
0.5 INJECTION, SOLUTION INTRAMUSCULAR; INTRAVENOUS; SUBCUTANEOUS
Status: DISCONTINUED | OUTPATIENT
Start: 2023-05-17 | End: 2023-05-17 | Stop reason: HOSPADM

## 2023-05-17 RX ORDER — ONDANSETRON 4 MG/1
4 TABLET, ORALLY DISINTEGRATING ORAL EVERY 8 HOURS PRN
Status: DISCONTINUED | OUTPATIENT
Start: 2023-05-17 | End: 2023-05-17 | Stop reason: HOSPADM

## 2023-05-17 RX ORDER — ONDANSETRON HYDROCHLORIDE 2 MG/ML
INJECTION, SOLUTION INTRAVENOUS AS NEEDED
Status: DISCONTINUED | OUTPATIENT
Start: 2023-05-17 | End: 2023-05-17 | Stop reason: SURG

## 2023-05-17 RX ORDER — IBUPROFEN 200 MG
16-32 TABLET ORAL AS NEEDED
Status: DISCONTINUED | OUTPATIENT
Start: 2023-05-17 | End: 2023-05-17 | Stop reason: HOSPADM

## 2023-05-17 RX ORDER — PROPOFOL 10 MG/ML
INJECTION, EMULSION INTRAVENOUS AS NEEDED
Status: DISCONTINUED | OUTPATIENT
Start: 2023-05-17 | End: 2023-05-17 | Stop reason: SURG

## 2023-05-17 RX ORDER — PHENYLEPHRINE HYDROCHLORIDE 10 MG/ML
INJECTION INTRAVENOUS AS NEEDED
Status: DISCONTINUED | OUTPATIENT
Start: 2023-05-17 | End: 2023-05-17 | Stop reason: SURG

## 2023-05-17 RX ORDER — ONDANSETRON HYDROCHLORIDE 2 MG/ML
4 INJECTION, SOLUTION INTRAVENOUS
Status: DISCONTINUED | OUTPATIENT
Start: 2023-05-17 | End: 2023-05-17 | Stop reason: HOSPADM

## 2023-05-17 RX ADMIN — SODIUM CHLORIDE 200 MG: 9 INJECTION, SOLUTION INTRAVENOUS at 11:33

## 2023-05-17 RX ADMIN — ONDANSETRON 4 MG: 2 INJECTION INTRAMUSCULAR; INTRAVENOUS at 11:37

## 2023-05-17 RX ADMIN — FENTANYL CITRATE 25 MCG: 50 INJECTION, SOLUTION INTRAMUSCULAR; INTRAVENOUS at 11:54

## 2023-05-17 RX ADMIN — SUCCINYLCHOLINE CHLORIDE 100 MG: 20 INJECTION, SOLUTION INTRAMUSCULAR; INTRAVENOUS at 11:14

## 2023-05-17 RX ADMIN — SODIUM CHLORIDE, SODIUM GLUCONATE, SODIUM ACETATE, POTASSIUM CHLORIDE AND MAGNESIUM CHLORIDE: 526; 502; 368; 37; 30 INJECTION, SOLUTION INTRAVENOUS at 11:20

## 2023-05-17 RX ADMIN — ACETAMINOPHEN 650 MG: 325 TABLET ORAL at 07:39

## 2023-05-17 RX ADMIN — PROPOFOL 100 MG: 10 INJECTION, EMULSION INTRAVENOUS at 11:14

## 2023-05-17 RX ADMIN — ROCURONIUM BROMIDE 30 MG: 10 INJECTION, SOLUTION INTRAVENOUS at 11:26

## 2023-05-17 RX ADMIN — PHENYLEPHRINE HYDROCHLORIDE 200 MCG: 10 INJECTION INTRAVENOUS at 11:14

## 2023-05-17 RX ADMIN — SODIUM CHLORIDE: 0.9 INJECTION, SOLUTION INTRAVENOUS at 11:13

## 2023-05-17 RX ADMIN — SUGAMMADEX 200 MG: 100 INJECTION, SOLUTION INTRAVENOUS at 11:35

## 2023-05-17 RX ADMIN — DEXAMETHASONE SODIUM PHOSPHATE 4 MG: 4 INJECTION, SOLUTION INTRA-ARTICULAR; INTRALESIONAL; INTRAMUSCULAR; INTRAVENOUS; SOFT TISSUE at 11:37

## 2023-05-17 RX ADMIN — SODIUM CHLORIDE 2000 ML: 9 INJECTION, SOLUTION INTRAVENOUS at 10:23

## 2023-05-17 ASSESSMENT — ENCOUNTER SYMPTOMS
DIZZINESS: 0
DIAPHORESIS: 0
FATIGUE: 0
CHILLS: 0
DYSURIA: 0
NAUSEA: 0
BACK PAIN: 0
FREQUENCY: 0
ABDOMINAL PAIN: 1
FLANK PAIN: 0
DIFFICULTY URINATING: 0
VOMITING: 0
FEVER: 0

## 2023-05-17 NOTE — PERIOPERATIVE NURSING NOTE
Dr Hernandez at bedside going to send patient home today. pts BP remains in the mid 80's giving more fluids .

## 2023-05-17 NOTE — ANESTHESIA PREPROCEDURE EVALUATION
Anesthesia ROS/MED HX    Anesthesia History - neg  Pulmonary - neg  Neuro/Psych - neg  Cardiovascular- neg  Hematological    anemia  GI/Hepatic- neg  Musculoskeletal- neg  Renal Disease- neg  Endo/Other- neg  ROS/MED HX Comments:    Anesthesia History: Massive transfusion- retained POC      Relevant Problems   HEMATOLOGY   (+) Anemia       Physical Exam    Airway   Mallampati: II   TM distance: <3 FB   Neck ROM: full  Cardiovascular - normal   Rhythm: regular   Rate: normalPulmonary - normal   clear to auscultation  Other Findings   Back - neg   landmarks identified    Dental - normal      Patient Active Problem List   Diagnosis   • Miscarriage   • Anemia   • Incomplete spontaneous  with delayed or excessive hemorrhage        History reviewed. No pertinent past medical history.    History reviewed. No pertinent surgical history.    Current Facility-Administered Medications   Medication Dose Route Frequency   • doxycycline  200 mg intravenous On call to OR   • sodium chloride 0.9 %  5 mL/hr intravenous PRN       Prior to Admission medications    Medication Sig Start Date End Date Taking? Authorizing Provider   cholecalciferol, vitamin D3, (VITAMIN D3) 10 mcg (400 unit) capsule 125 mcg. 23  Yes Provider, efrain Devlin, MD       CBC Results       05/17/23 05/17/23 05/15/23     1017 0736 1856    WBC -- 9.91 7.64    RBC -- 4.12 4.08    HGB 9.8 12.6 12.6    HCT 29.1 38.0 37.3    MCV -- 92.2 91.4    MCH -- 30.6 30.9    MCHC -- 33.2 33.8    PLT -- 310 330         Comment for HGB at 1017 on 23: RESULT CHECKED. SPECIMEN QUALITY CHECKED          BMP Results       05/17/23 05/15/23     0736 1856     136    K 3.7 3.6    Cl 103 104    CO2 25 24    Glucose 98 117    BUN 13 11    Creatinine 0.5 0.6    Calcium 9.4 9.2    Anion Gap 8 8    EGFR >60.0 >60.0         Comment for K at 0736 on 23: Results obtained on plasma. Plasma Potassium values may be up to 0.4 mEQ/L less than serum values. The  differences may be greater for patients with high platelet or white cell counts.    Comment for K at 1856 on 05/15/23: Results obtained on plasma. Plasma Potassium values may be up to 0.4 mEQ/L less than serum values. The differences may be greater for patients with high platelet or white cell counts.          Lab Results   Component Value Date    HCGQUANT 3,603.0 (H) 05/17/2023             ULTRASOUND PREGNANCY TRANSVAGINAL ONLY   Final Result   IMPRESSION:   Previously seen intrauterine gestational sac is no longer identified, compatible   with failed first trimester pregnancy. Thickened endometrium with internal   vascularity, suspicious for retained products of conception.          Anesthesia Plan    Plan: general    Technique: general endotracheal     Lines and Monitors: PIV and additional IV     Airway: video laryngoscope and oral intubation   3 ASA - emergent  Blood Products:     Use of Blood Products Discussed: Yes   Anesthetic plan and risks discussed with: patient and spouse  Induction:    intravenous   Postop Plan:   Patient Disposition: inpatient floor planned admission   Pain Management: IV analgesics  Comments:    Plan: Called to OR for massive transfusion- L Held responded.  Decision made to take pt to OR immediately- massive hemorrahge, retained POC er Dr Hernandez.  BP 84/40 in ER.  Plan GETA/RSI. NO written consent obtained- life threatening emergency.  Massive transfusion protocol underway.

## 2023-05-17 NOTE — OP NOTE
First Trimester D&E Operative report    Pre-op Diagnosis: 37 y.o. yo  at 7 weeks gestation by US in office with missed AB presenting hemorrhaging to the ED with retained products of conception, hemorrhage, hemodynamically unstable    Post-op Diagnosis: same      Procedure(s):   1. Dilation and evacuation under ultrasound guidance    Surgeon: Mary Hernandez MD  Anesthesia: General  Assist: Dr. Arauz's assistance was required for US guidance    IVF: 2000  EBL: 500 on entering OR, minimal bleeding during procedure    Antibiotics: 200mg IV Doxycycline    Complications: None    Specimen: Products of conception sent to pathology    Findings: Approximately 8 week sized uterus    Description of Procedure:   The patient was met in the Emergency room and found to be in trendelenberg with BP 84/45, , under multiple blankets appearing pale and having previously fainted. The patient was noted to have hgb drop from 12 to 9 and continued bleeding. A massive transfusion protocol as called. She was verbally consented with her  at the bedside and emergently taken to the operating room where she underwent general anesthesia. The patient was placed in dorsal, lithotomy position. The patient was prepped and draped in normal, sterile, fashion. A sterile speculum was placed in the vagina and copious blood, clots and products of conception were seen in the vagina which were removed with ring forceps. An Allis tenaculum was used to grasp the anterior lip of the cervix. An 8mm suction cannula was used to empty the contents of the uterus under US guidance. Uterine cry which was noted in all quadrants. The patient was given 1000 mcg of rectal misoprostol. Excellent homeostasis was noted at this time.     All instruments were then removed from the cervix and vagina. The patient tolerated the procedure well and was taken to the recovery room in stable condition. All sponge, needle and instrument counts were correct x 2.      The  fetal tissue was sent to pathology for autopsy.    Mary Hernandez MD  12:16 PM, 5/17/2023  [facility]  Office: [phone]

## 2023-05-17 NOTE — PERIOPERATIVE NURSING NOTE
Pt met all discharge criteria.  Pt AAOx4, denies pain, states cramping only.  Pt tolerating PO intake well.  OOB to BR to void without issue. Discharge instructions given to patient and  with verbalization of understanding.  IVs all removed per protocol.  Pt taken in wheelchair to ER entrance where  was parked and going to drive pt home.

## 2023-05-17 NOTE — ED ATTESTATION NOTE
I saw and evaluated the patient, participated in the management, and agree with the findings in the above note. We discussed the case and the treatment plan.    Exam:  Patient Vitals for the past 72 hrs:   BP Temp Pulse Resp SpO2   05/15/23 2157 (!) 102/55 -- 79 16 99 %   05/15/23 1852 106/60 37.2 °C (98.9 °F) 99 18 100 %     VS reviewed, NAD, nontoxic, head at/nc, normal speech, no resp distress, normal skin tone, coordinated movement.        Yousif Seals DO  05/17/23 1300

## 2023-05-17 NOTE — ANESTHESIA PROCEDURE NOTES
Airway  Urgency: emergent    Start Time: 5/17/2023 11:16 AM    General Information and Staff    Patient location during procedure: OR  Anesthesiologist: Tova Espinoza MD  Performed: anesthesiologist   Performed by: Carlene Trinidad DO  Authorized by: Tova Espinoza MD      Consent for Airway (if performed for an anesthetic, see related documentation for consents)  Patient identity confirmed: verbally with patient  Consent: The procedure was performed in an emergent situation. Verbal consent not obtained. Written consent not obtained.  Risks and benefits: risks, benefits and alternatives were not discussed  Consent given by: patient      Indications and Patient Condition  Indications for airway management: anesthesia  Sedation level: general  Preoxygenated: yes  Patient position: sniffing  MILS maintained throughout  Mask difficulty assessment: 0 - not attempted    Final Airway Details  Final airway type: endotracheal airway      Successful airway: ETT  Cuffed: no   Successful intubation technique: video laryngoscopy  Facilitating devices/methods: cricoid pressure and intubating stylet  Endotracheal tube insertion site: oral  Blade: Edgardo  Blade size: #3  ETT size (mm): 7.0  Cormack-Lehane Classification: grade I - full view of glottis  Placement verified by: chest auscultation and capnometry   Measured from: lips  ETT to lips (cm): 22  Number of attempts at approach: 1  Number of other approaches attempted: 0  Atraumatic airway insertion             [FreeTextEntry1] : Location-both HEELS, RIGHT >LEFT\par Duration-> 1 YEAR months\par Past tx- self PT, formal PT, RICE, chilo, \par requests a firmer pair does not think these support her enough\par

## 2023-05-17 NOTE — ED ATTESTATION NOTE
I have personally seen and examined the patient.  I reviewed and agree with physician assistant / nurse practitioner’s assessment and plan of care.     Exam: Patient is uncomfortable but stable in no acute distress.  Her vital signs are normal and she is afebrile.  Patient is uncomfortable with pelvic cramping.  Pelvic exam will be performed by the PA and by the consulting OB/GYN.    Plan: Patient returns 2 days after initial diagnosis of threatened miscarriage with worsening pain and bleeding.  Patient is scheduled for an outpatient D&E but the bleeding became heavy and she presents to the ER today for evaluation.  We will check a repeat beta quant and a repeat ultrasound and consult OB to evaluate           CastanedaSunny DO  05/17/23 0733

## 2023-05-17 NOTE — PROGRESS NOTES
Patient comfortably lying in PACU, continued hypotension. Repeat Hgb 11, no tachycardia, no further vaginal bleeding. May d/c to home pending eating/void/ambulation.    Mary Hernandez MD  1:39 PM, 5/17/2023  [facility]  Office: [phone]

## 2023-05-17 NOTE — PROGRESS NOTES
Talked to  Nasreen Travis. Confirmed pt ID. Advised for the need for the emergent procedure, explained the procedure in detail. Advised for acute blood loss anemia and plan for 2 unit PRBC transfusion. Plan for observation and possible DC home if hemodynamically stable after transfusion. Discussed post op recovery and when to call the office. All questions answered.

## 2023-05-17 NOTE — ANESTHESIOLOGIST PRE-PROCEDURE ATTESTATION
Pre-Procedure Patient Identification:  I am the Primary Anesthesiologist and have identified the patient on 05/17/23 at 11:32 AM.   I have confirmed the procedure(s) will be performed by the following surgeon/proceduralist Mary Hernandez MD.

## 2023-05-17 NOTE — ED PROVIDER NOTES
Emergency Medicine Note  HPI   HISTORY OF PRESENT ILLNESS   37yF  currently 10 weeks pregnant by date of LMP presents to ED for evaluation due to vaginal bleeding. Pt reports she saw OB yesterday and was going to have D&E tomorrow but overnight had increased pain and bleeding. Per , they d/w OB today and were recommended she come to ED for evaluation and possible D&E today.    History provided by:  Patient   used: No    Vaginal Bleeding - Pregnant  Quality:  Bright red  Severity:  Moderate  Duration:  12 hours  Timing:  Constant  Progression:  Unchanged  Chronicity:  New  Relieved by:  Nothing  Worsened by:  Nothing  Associated symptoms: abdominal pain    Associated symptoms: no back pain, no dizziness, no dyspareunia, no dysuria, no fatigue, no fever, no nausea and no vaginal discharge          Patient History   PAST HISTORY     Reviewed from Nursing Triage:  Allergies       History reviewed. No pertinent past medical history.    History reviewed. No pertinent surgical history.    History reviewed. No pertinent family history.    Social History     Tobacco Use   • Smoking status: Never   • Smokeless tobacco: Never   Substance Use Topics   • Alcohol use: Never   • Drug use: Never         Review of Systems   REVIEW OF SYSTEMS     Review of Systems   Constitutional: Negative for chills, diaphoresis, fatigue and fever.   Gastrointestinal: Positive for abdominal pain. Negative for nausea and vomiting.   Genitourinary: Positive for vaginal bleeding. Negative for decreased urine volume, difficulty urinating, dyspareunia, dysuria, flank pain, frequency, urgency and vaginal discharge.   Musculoskeletal: Negative for back pain.   Neurological: Negative for dizziness.         VITALS     ED Vitals    Date/Time Temp Pulse Resp BP SpO2 [facility]   23 1100 -- 94 25 89/46 100 % [facility]   23 1055 -- 95 -- 87/44 -- [facility]   23 1052 -- 91 -- 87/44 -- [facility]   23 1040 -- 85 -- 90/45 -- [facility]    05/17/23 1034 -- 93 -- 86/50 -- [facility]   05/17/23 1032 -- 81 -- 84/49 -- [facility]   05/17/23 1028 -- 91 -- 100/56 -- [facility]   05/17/23 1025 -- 80 -- 83/51 -- [facility]   05/17/23 1023 -- 86 -- 81/58 -- [facility]   05/17/23 1021 -- -- -- 82/50 -- [facility]   05/17/23 1018 -- 75 -- 70/51 -- [facility]   05/17/23 1016 -- -- -- 72/42 -- [facility]   05/17/23 1014 -- 77 -- 62/44 -- [facility]   05/17/23 1013 -- 67 -- 58/34 -- [facility]   05/17/23 1010 -- 52 18 52/30 96 % [facility]   05/17/23 0721 36.9 °C (98.4 °F) 90 19 109/62 98 % AMT        Pulse Ox %: 98 % (05/17/23 0724)  Pulse Ox Interpretation: Normal (05/17/23 0724)           Physical Exam   PHYSICAL EXAM     Physical Exam  Vitals and nursing note reviewed.   Constitutional:       General: She is not in acute distress.  Cardiovascular:      Rate and Rhythm: Normal rate and regular rhythm.      Pulses: Normal pulses.   Pulmonary:      Effort: Pulmonary effort is normal. No respiratory distress.   Abdominal:      General: Abdomen is flat. There is no distension.      Tenderness: There is abdominal tenderness in the right lower quadrant, suprapubic area and left lower quadrant.   Musculoskeletal:         General: Normal range of motion.   Skin:     General: Skin is warm and dry.      Capillary Refill: Capillary refill takes less than 2 seconds.   Neurological:      Mental Status: She is alert and oriented to person, place, and time.   Psychiatric:         Mood and Affect: Mood normal.         Behavior: Behavior normal.           PROCEDURES     Procedures     DATA     Results     Procedure Component Value Units Date/Time    Hemoglobin and hematocrit [[id number]]  (Abnormal) Collected: 05/17/23 1017    Specimen: Blood, Venous Updated: 05/17/23 1033     Hemoglobin 9.8 g/dL      Comment: RESULT CHECKED. SPECIMEN QUALITY CHECKED        Hematocrit 29.1 %     Type and screen [[id number]] Collected: 05/17/23 0736    Specimen: Blood, Venous Updated: 05/17/23 0850     Specimen Expiration 05/20/2023     Antibody Screen Negative     ABO A      Rh Factor Positive     History Check Previous type on file    CG, Serum, Quant [[id number]]  (Abnormal) Collected: 05/17/23 0736    Specimen: Blood, Venous Updated: 05/17/23 0846     hCG Quant 3,603.0 IU/L (mIU/mL)      Comment: Approx. Gestational Age   Approx. hCG Range         (Weeks)                  (IU/L)          0.2-1                    5-50            1-2                               2-3                  100-5000            3-4                  500-10,000            4-5                 1000-50,000            5-6               10,000-100,000            6-8               15,000-200,000            8-12              10,000-100,000       Basic metabolic panel [[id number]]  (Abnormal) Collected: 05/17/23 0736    Specimen: Blood, Venous Updated: 05/17/23 0832     Sodium 136 mEQ/L      Potassium 3.7 mEQ/L      Comment: Results obtained on plasma. Plasma Potassium values may be up to 0.4 mEQ/L less than serum values. The differences may be greater for patients with high platelet or white cell counts.        Chloride 103 mEQ/L      CO2 25 mEQ/L      BUN 13 mg/dL      Creatinine 0.5 mg/dL      Glucose 98 mg/dL      Calcium 9.4 mg/dL      eGFR >60.0 mL/min/1.73m*2      Anion Gap 8 mEQ/L     CBC and differential [[id number]]  (Abnormal) Collected: 05/17/23 0736    Specimen: Blood, Venous Updated: 05/17/23 0744     WBC 9.91 K/uL      RBC 4.12 M/uL      Hemoglobin 12.6 g/dL      Hematocrit 38.0 %      MCV 92.2 fL      MCH 30.6 pg      MCHC 33.2 g/dL      RDW 12.3 %      Platelets 310 K/uL      MPV 10.3 fL      Differential Type Auto     nRBC 0.0 %      Immature Granulocytes 0.4 %      Neutrophils 71.2 %      Lymphocytes 21.7 %      Monocytes 5.7 %      Eosinophils 0.8 %      Basophils 0.2 %      Immature Granulocytes, Absolute 0.04 K/uL      Neutrophils, Absolute 7.06 K/uL      Lymphocytes, Absolute 2.15 K/uL      Monocytes, Absolute 0.56 K/uL      Eosinophils, Absolute 0.08 K/uL      Basophils, Absolute 0.02  K/uL           Imaging Results          ULTRASOUND PREGNANCY TRANSVAGINAL ONLY (Final result)  Result time 05/17/23 08:17:34    Final result                 Impression:    IMPRESSION:  Previously seen intrauterine gestational sac is no longer identified, compatible  with failed first trimester pregnancy. Thickened endometrium with internal  vascularity, suspicious for retained products of conception.             Narrative:    CLINICAL HISTORY: Worsening pelvic pain and bleeding. Recent threatened  miscarriage.    COMMENT:    Comparison: Ultrasound pregnancy 5/15/2023    Technique: Transvaginal pelvic ultrasound was performed.    Findings:  Uterus is normal in size measuring 10.6 x 5.6 x 5.7 cm.  No fibroids are seen.    Endometrium is thickened measuring 1.5 cm. The previously seen intrauterine  gestational sac is no longer identified. There is internal vascularity within  the endometrium, suspicious for retained products of conception.    Right ovary is mildly enlarged measuring  4.0 x 2.7 x 2.9 cm. There is a corpus  luteum measuring 1.7 x 1.5 x 1.4 cm.    Left ovary is normal in size and appearance measuring  3.5 x 1.7 x 1.3 cm.    No adnexal masses.    Trace physiologic free fluid in the cul-de-sac.                                No orders to display       Scoring tools                                  ED Course & MDM   MDM / ED COURSE / CLINICAL IMPRESSION / DISPO     Medical Decision Making  Anemia, unspecified type: acute illness or injury that poses a threat to life or bodily functions  Miscarriage: acute illness or injury that poses a threat to life or bodily functions  Vasovagal syncope: acute illness or injury  Amount and/or Complexity of Data Reviewed  Labs: ordered. Decision-making details documented in ED Course.  Radiology: ordered. Decision-making details documented in ED Course.      Risk  OTC drugs.  Decision regarding hospitalization.          ED Course as of 05/17/23 1908   Wed May 17, 2023    0855 Ultrasound is consistent with retained products of conception and no longer intrauterine gestation consistent with miscarriage.  Will discuss with OB/GYN [LUIZ]   0855 hCG Quant(!): 3,603.0  Decreased from 2 days ago [LUIZ]   0859 Rh Factor: Positive [KL]   0900 ULTRASOUND PREGNANCY TRANSVAGINAL ONLY  IMPRESSION:  Previously seen intrauterine gestational sac is no longer identified, compatible  with failed first trimester pregnancy. Thickened endometrium with internal  vascularity, suspicious for retained products of conception. [KL]   0900 Unit secretary paging OB to discuss US findings. [KL]   0955 NP from office returned call. Office was erroneously called and not in-house OB on call.  Will call upstairs. [KL]   1010 RN called this provider and ED physician to bedside as patient had syncopal event. [KL]   1014 D/w Dr. Huertas, OB/gyn. Aware of vasovagal event and concern for retained products on US. Dr. Hernandez is on call, but currently in OR. Dr. Huertas states she will notify her of patient.  [KL]   1033 Patient had a vasovagal episode after an episode of heavy vaginal bleeding and passage of clots.  Patient quickly returned to baseline after being positioned in Trendelenburg and given IV fluids.  Vaginal examination reveals very large clots and blood but no active hemorrhage.  Will reconsult OB/GYN to evaluate [LUIZ]   1059 Patient remains hypotensive in spite of 2 L of fluid.  Hemoglobin dropped from 12.6 to 9.8 we will type and cross for 1 unit of packed red blood cells [LUIZ]   1059 Will reconsult OB to arrange for D&E [LUIZ]   1101 D/w Dr. Hernandez, OB, she was not yet aware of patient as she is still in OR. I discussed case with her and she will be down to evaluate immediately. [KL]   1105 Massive transfusion protocol initiated per Dr. Hernandez. [KL]   1114 With significant drop in hemoglobin Dr. Hernandez felt patient was most needed for stat OR intervention.  Massive transfusion protocol was initiated.  Patient to  OR immediately [LUIZ]   1114 Critical care time 75 minutes provided by me [LUIZ]      ED Course User Index  [LUIZ] Sunny Castaneda DO  [KL] Elida Briceno PA C     Clinical Impression      Miscarriage   Vasovagal syncope   Anemia, unspecified type     _________________     ED Disposition   Send to OR / Endoscopy                   Elida Briceno PA C  05/17/23 2524

## 2023-05-17 NOTE — ANESTHESIA POSTPROCEDURE EVALUATION
Patient: Karlos Perryed    Procedure Summary     Date: 23 Room / Location: [facility] OR [address] [place] [address] OR [facility]    Anesthesia Start: 1113 Anesthesia Stop: 1157    Procedure: D&C, CERVICAL DILATION/DILATATION (Vagina ) Diagnosis:       Miscarriage      Anemia, unspecified type      Incomplete spontaneous  with delayed or excessive hemorrhage      (Miscarriage [O03.9])      (Anemia, unspecified type [D64.9])      (Incomplete spontaneous  with delayed or excessive hemorrhage [O03.1])    Surgeons: Mary Hernandez MD Responsible Provider: Carlene Trinidad DO    Anesthesia Type: general ASA Status: 3 - Emergent          Anesthesia Type: general  PACU Vitals  2023 1142 - 2023 1229      2023  1150 2023  1155 2023  1200 2023  1215    BP: -- 86/52 89/53 91/55    Temp: 36.2 °C (97.2 °F) -- -- --    Pulse: 94 98 96 88    Resp: -- -- 31 11    SpO2: 100 % -- 100 % 100 %            Anesthesia Post Evaluation    Pain management: adequate  Patient participation: complete - patient participated  Level of consciousness: awake and alert  Cardiovascular status: acceptable  Airway Patency: adequate  Respiratory status: acceptable  Hydration status: acceptable  Anesthetic complications: no

## 2023-05-17 NOTE — DISCHARGE INSTRUCTIONS
Please call Dr. Hernandez's office for a follow-up appointment and evaluation- the office can be reached at [phone].       Please call the office for heavy vaginal bleeding (Soaking through more than 2 pads an hour for more than 2 hours), abdominal pain or fever. The answering service will forward your call if it is after hours.

## 2023-05-18 LAB
ATRIAL RATE: 68
CASE RPRT: NORMAL
CLINICAL INFO: NORMAL
P AXIS: 37
PATH REPORT.FINAL DX SPEC: NORMAL
PATH REPORT.GROSS SPEC: NORMAL
PR INTERVAL: 158
QRS DURATION: 84
QT INTERVAL: 418
QTC CALCULATION(BAZETT): 444
R AXIS: 10
T WAVE AXIS: 20
VENTRICULAR RATE: 68

## 2023-05-19 LAB
CROSSMATCH: NORMAL
ISBT CODE: 6200
ISBT CODE: 9500
PRODUCT CODE: NORMAL
PRODUCT STATUS: NORMAL
SPECIMEN EXP DATE BLD: NORMAL
UNIT ABO: NORMAL
UNIT ID: NORMAL
UNIT RH: NEGATIVE
UNIT RH: POSITIVE

## 2023-12-04 ENCOUNTER — APPOINTMENT (RX ONLY)
Dept: URBAN - METROPOLITAN AREA CLINIC 28 | Facility: CLINIC | Age: 38
Setting detail: DERMATOLOGY
End: 2023-12-04

## 2023-12-04 DIAGNOSIS — Z02.9 ENCOUNTER FOR ADMINISTRATIVE EXAMINATIONS, UNSPECIFIED: ICD-10-CM

## 2023-12-04 DIAGNOSIS — L65.0 TELOGEN EFFLUVIUM: ICD-10-CM | Status: INADEQUATELY CONTROLLED

## 2023-12-04 PROCEDURE — ? PRESCRIPTION

## 2023-12-04 PROCEDURE — 99214 OFFICE O/P EST MOD 30 MIN: CPT

## 2023-12-04 PROCEDURE — ? COUNSELING

## 2023-12-04 PROCEDURE — ? ORDER TESTS

## 2023-12-04 PROCEDURE — ? PRESCRIPTION MEDICATION MANAGEMENT

## 2023-12-04 PROCEDURE — ? MEDICATION COUNSELING

## 2023-12-04 RX ORDER — KETOCONAZOLE 20 MG/ML
SHAMPOO, SUSPENSION TOPICAL
Qty: 120 | Refills: 6 | Status: ERX | COMMUNITY
Start: 2023-12-04

## 2023-12-04 RX ORDER — CLOBETASOL PROPIONATE 0.5 MG/ML
SOLUTION TOPICAL
Qty: 50 | Refills: 4 | Status: ERX | COMMUNITY
Start: 2023-12-04

## 2023-12-04 RX ADMIN — KETOCONAZOLE: 20 SHAMPOO, SUSPENSION TOPICAL at 00:00

## 2023-12-04 RX ADMIN — CLOBETASOL PROPIONATE: 0.5 SOLUTION TOPICAL at 00:00

## 2023-12-04 ASSESSMENT — LOCATION SIMPLE DESCRIPTION DERM: LOCATION SIMPLE: SCALP

## 2023-12-04 ASSESSMENT — LOCATION ZONE DERM: LOCATION ZONE: SCALP

## 2023-12-04 ASSESSMENT — LOCATION DETAILED DESCRIPTION DERM: LOCATION DETAILED: RIGHT SUPERIOR PARIETAL SCALP

## 2023-12-04 NOTE — PROCEDURE: ORDER TESTS
Performing Laboratory: 0
Expected Date Of Service: 12/04/2023
Bill For Surgical Tray: no
Billing Type: Third-Party Bill

## 2023-12-04 NOTE — PROCEDURE: PRESCRIPTION MEDICATION MANAGEMENT
Initiate Treatment: ketoconazole 2 % shampoo: Wash hair with shampoo during every other hair wash; leave on for 5-10 min then rinse\\nclobetasol 0.05 % scalp solution: Apply to dry scalp, leave on for 1 hour or overnight QHS a minimum of 4 nights a week x1 month
Render In Strict Bullet Format?: No
Detail Level: Zone

## 2023-12-04 NOTE — PROCEDURE: MEDICATION COUNSELING
Soolantra Counseling: I discussed with the patients the risks of topial Soolantra. This is a medicine which decreases the number of mites and inflammation in the skin. You experience burning, stinging, eye irritation or allergic reactions.  Please call our office if you develop any problems from using this medication.
Propranolol Counseling:  I discussed with the patient the risks of propranolol including but not limited to low heart rate, low blood pressure, low blood sugar, restlessness and increased cold sensitivity. They should call the office if they experience any of these side effects.
Cibinqo Counseling: I discussed with the patient the risks of Cibinqo therapy including but not limited to common cold, nausea, headache, cold sores, increased blood CPK levels, dizziness, UTIs, fatigue, acne, and vomitting. Live vaccines should be avoided.  This medication has been linked to serious infections; higher rate of mortality; malignancy and lymphoproliferative disorders; major adverse cardiovascular events; thrombosis; thrombocytopenia and lymphopenia; lipid elevations; and retinal detachment.
Erythromycin Pregnancy And Lactation Text: This medication is Pregnancy Category B and is considered safe during pregnancy. It is also excreted in breast milk.
Simponi Pregnancy And Lactation Text: The risk during pregnancy and breastfeeding is uncertain with this medication.
Benzoyl Peroxide Counseling: Patient counseled that medicine may cause skin irritation and bleach clothing.  In the event of skin irritation, the patient was advised to reduce the amount of the drug applied or use it less frequently.   The patient verbalized understanding of the proper use and possible adverse effects of benzoyl peroxide.  All of the patient's questions and concerns were addressed.
Arava Pregnancy And Lactation Text: This medication is Pregnancy Category X and is absolutely contraindicated during pregnancy. It is unknown if it is excreted in breast milk.
Itraconazole Pregnancy And Lactation Text: This medication is Pregnancy Category C and it isn't know if it is safe during pregnancy. It is also excreted in breast milk.
Erivedge Counseling- I discussed with the patient the risks of Erivedge including but not limited to nausea, vomiting, diarrhea, constipation, weight loss, changes in the sense of taste, decreased appetite, muscle spasms, and hair loss.  The patient verbalized understanding of the proper use and possible adverse effects of Erivedge.  All of the patient's questions and concerns were addressed.
High Dose Vitamin A Counseling: Side effects reviewed, pt to contact office should one occur.
Hydroxyzine Counseling: Patient advised that the medication is sedating and not to drive a car after taking this medication.  Patient informed of potential adverse effects including but not limited to dry mouth, urinary retention, and blurry vision.  The patient verbalized understanding of the proper use and possible adverse effects of hydroxyzine.  All of the patient's questions and concerns were addressed.
Topical Ketoconazole Pregnancy And Lactation Text: This medication is Pregnancy Category B and is considered safe during pregnancy. It is unknown if it is excreted in breast milk.
Klisyri Counseling:  I discussed with the patient the risks of Klisyri including but not limited to erythema, scaling, itching, weeping, crusting, and pain.
Picato Pregnancy And Lactation Text: This medication is Pregnancy Category C. It is unknown if this medication is excreted in breast milk.
Opioid Pregnancy And Lactation Text: These medications can lead to premature delivery and should be avoided during pregnancy. These medications are also present in breast milk in small amounts.
Glycopyrrolate Counseling:  I discussed with the patient the risks of glycopyrrolate including but not limited to skin rash, drowsiness, dry mouth, difficulty urinating, and blurred vision.
Winlevi Pregnancy And Lactation Text: This medication is considered safe during pregnancy and breastfeeding.
Finasteride Pregnancy And Lactation Text: This medication is absolutely contraindicated during pregnancy. It is unknown if it is excreted in breast milk.
Ilumya Counseling: I discussed with the patient the risks of tildrakizumab including but not limited to immunosuppression, malignancy, posterior leukoencephalopathy syndrome, and serious infections.  The patient understands that monitoring is required including a PPD at baseline and must alert us or the primary physician if symptoms of infection or other concerning signs are noted.
Ketoconazole Counseling:   Patient counseled regarding improving absorption with orange juice.  Adverse effects include but are not limited to breast enlargement, headache, diarrhea, nausea, upset stomach, liver function test abnormalities, taste disturbance, and stomach pain.  There is a rare possibility of liver failure that can occur when taking ketoconazole. The patient understands that monitoring of LFTs may be required, especially at baseline. The patient verbalized understanding of the proper use and possible adverse effects of ketoconazole.  All of the patient's questions and concerns were addressed.
Sarecycline Pregnancy And Lactation Text: This medication is Pregnancy Category D and not consider safe during pregnancy. It is also excreted in breast milk.
Topical Metronidazole Counseling: Metronidazole is a topical antibiotic medication. You may experience burning, stinging, redness, or allergic reactions.  Please call our office if you develop any problems from using this medication.
Drysol Pregnancy And Lactation Text: This medication is considered safe during pregnancy and breast feeding.
Cimzia Pregnancy And Lactation Text: This medication crosses the placenta but can be considered safe in certain situations. Cimzia may be excreted in breast milk.
Cibinqo Pregnancy And Lactation Text: It is unknown if this medication will adversely affect pregnancy or breast feeding.  You should not take this medication if you are currently pregnant or planning a pregnancy or while breastfeeding.
Metronidazole Counseling:  I discussed with the patient the risks of metronidazole including but not limited to seizures, nausea/vomiting, a metallic taste in the mouth, nausea/vomiting and severe allergy.
Cyclophosphamide Counseling:  I discussed with the patient the risks of cyclophosphamide including but not limited to hair loss, hormonal abnormalities, decreased fertility, abdominal pain, diarrhea, nausea and vomiting, bone marrow suppression and infection. The patient understands that monitoring is required while taking this medication.
Use Enhanced Medication Counseling?: No
Xeljanz Counseling: I discussed with the patient the risks of Xeljanz therapy including increased risk of infection, liver issues, headache, diarrhea, or cold symptoms. Live vaccines should be avoided. They were instructed to call if they have any problems.
Olanzapine Counseling- I discussed with the patient the common side effects of olanzapine including but are not limited to: lack of energy, dry mouth, increased appetite, sleepiness, tremor, constipation, dizziness, changes in behavior, or restlessness.  Explained that teenagers are more likely to experience headaches, abdominal pain, pain in the arms or legs, tiredness, and sleepiness.  Serious side effects include but are not limited: increased risk of death in elderly patients who are confused, have memory loss, or dementia-related psychosis; hyperglycemia; increased cholesterol and triglycerides; and weight gain.
Klisyri Pregnancy And Lactation Text: It is unknown if this medication can harm a developing fetus or if it is excreted in breast milk.
Glycopyrrolate Pregnancy And Lactation Text: This medication is Pregnancy Category B and is considered safe during pregnancy. It is unknown if it is excreted breast milk.
Stelara Counseling:  I discussed with the patient the risks of ustekinumab including but not limited to immunosuppression, malignancy, posterior leukoencephalopathy syndrome, and serious infections.  The patient understands that monitoring is required including a PPD at baseline and must alert us or the primary physician if symptoms of infection or other concerning signs are noted.
Detail Level: Simple
Propranolol Pregnancy And Lactation Text: This medication is Pregnancy Category C and it isn't known if it is safe during pregnancy. It is excreted in breast milk.
Birth Control Pills Counseling: Birth Control Pill Counseling: I discussed with the patient the potential side effects of OCPs including but not limited to increased risk of stroke, heart attack, thrombophlebitis, deep venous thrombosis, hepatic adenomas, breast changes, GI upset, headaches, and depression.  The patient verbalized understanding of the proper use and possible adverse effects of OCPs. All of the patient's questions and concerns were addressed.
Soolantra Pregnancy And Lactation Text: This medication is Pregnancy Category C. This medication is considered safe during breast feeding.
Hydroxyzine Pregnancy And Lactation Text: This medication is not safe during pregnancy and should not be taken. It is also excreted in breast milk and breast feeding isn't recommended.
Elidel Counseling: Patient may experience a mild burning sensation during topical application. Elidel is not approved in children less than 2 years of age. There have been case reports of hematologic and skin malignancies in patients using topical calcineurin inhibitors although causality is questionable.
Benzoyl Peroxide Pregnancy And Lactation Text: This medication is Pregnancy Category C. It is unknown if benzoyl peroxide is excreted in breast milk.
High Dose Vitamin A Pregnancy And Lactation Text: High dose vitamin A therapy is contraindicated during pregnancy and breast feeding.
Clofazimine Counseling:  I discussed with the patient the risks of clofazimine including but not limited to skin and eye pigmentation, liver damage, nausea/vomiting, gastrointestinal bleeding and allergy.
Protopic Counseling: Patient may experience a mild burning sensation during topical application. Protopic is not approved in children less than 2 years of age. There have been case reports of hematologic and skin malignancies in patients using topical calcineurin inhibitors although causality is questionable.
Topical Metronidazole Pregnancy And Lactation Text: This medication is Pregnancy Category B and considered safe during pregnancy.  It is also considered safe to use while breastfeeding.
Cosentyx Counseling:  I discussed with the patient the risks of Cosentyx including but not limited to worsening of Crohn's disease, immunosuppression, allergic reactions and infections.  The patient understands that monitoring is required including a PPD at baseline and must alert us or the primary physician if symptoms of infection or other concerning signs are noted.
Cyclophosphamide Pregnancy And Lactation Text: This medication is Pregnancy Category D and it isn't considered safe during pregnancy. This medication is excreted in breast milk.
Xelemz Pregnancy And Lactation Text: This medication is Pregnancy Category D and is not considered safe during pregnancy.  The risk during breast feeding is also uncertain.
Tetracycline Counseling: Patient counseled regarding possible photosensitivity and increased risk for sunburn.  Patient instructed to avoid sunlight, if possible.  When exposed to sunlight, patients should wear protective clothing, sunglasses, and sunscreen.  The patient was instructed to call the office immediately if the following severe adverse effects occur:  hearing changes, easy bruising/bleeding, severe headache, or vision changes.  The patient verbalized understanding of the proper use and possible adverse effects of tetracycline.  All of the patient's questions and concerns were addressed. Patient understands to avoid pregnancy while on therapy due to potential birth defects.
Hydroxychloroquine Counseling:  I discussed with the patient that a baseline ophthalmologic exam is needed at the start of therapy and every year thereafter while on therapy. A CBC may also be warranted for monitoring.  The side effects of this medication were discussed with the patient, including but not limited to agranulocytosis, aplastic anemia, seizures, rashes, retinopathy, and liver toxicity. Patient instructed to call the office should any adverse effect occur.  The patient verbalized understanding of the proper use and possible adverse effects of Plaquenil.  All the patient's questions and concerns were addressed.
Stelara Pregnancy And Lactation Text: This medication is Pregnancy Category B and is considered safe during pregnancy. It is unknown if this medication is excreted in breast milk.
Litfulo Counseling: I discussed with the patient the risks of Litfulo therapy including but not limited to upper respiratory tract infections, shingles, cold sores, and nausea. Live vaccines should be avoided.  This medication has been linked to serious infections; higher rate of mortality; malignancy and lymphoproliferative disorders; major adverse cardiovascular events; thrombosis; gastrointestinal perforations; neutropenia; lymphopenia; anemia; liver enzyme elevations; and lipid elevations.
VTAMA Counseling: I discussed with the patient that VTAMA is not for use in the eyes, mouth or mouth. They should call the office if they develop any signs of allergic reactions to VTAMA. The patient verbalized understanding of the proper use and possible adverse effects of VTAMA.  All of the patient's questions and concerns were addressed.
SSKI Counseling:  I discussed with the patient the risks of SSKI including but not limited to thyroid abnormalities, metallic taste, GI upset, fever, headache, acne, arthralgias, paraesthesias, lymphadenopathy, easy bleeding, arrhythmias, and allergic reaction.
Ketoconazole Pregnancy And Lactation Text: This medication is Pregnancy Category C and it isn't know if it is safe during pregnancy. It is also excreted in breast milk and breast feeding isn't recommended.
Metronidazole Pregnancy And Lactation Text: This medication is Pregnancy Category B and considered safe during pregnancy.  It is also excreted in breast milk.
Olanzapine Pregnancy And Lactation Text: This medication is pregnancy category C.   There are no adequate and well controlled trials with olanzapine in pregnant females.  Olanzapine should be used during pregnancy only if the potential benefit justifies the potential risk to the fetus.   In a study in lactating healthy women, olanzapine was excreted in breast milk.  It is recommended that women taking olanzapine should not breast feed.
Libtayo Counseling- I discussed with the patient the risks of Libtayo including but not limited to nausea, vomiting, diarrhea, and bone or muscle pain.  The patient verbalized understanding of the proper use and possible adverse effects of Libtayo.  All of the patient's questions and concerns were addressed.
Albendazole Counseling:  I discussed with the patient the risks of albendazole including but not limited to cytopenia, kidney damage, nausea/vomiting and severe allergy.  The patient understands that this medication is being used in an off-label manner.
Cephalexin Counseling: I counseled the patient regarding use of cephalexin as an antibiotic for prophylactic and/or therapeutic purposes. Cephalexin (commonly prescribed under brand name Keflex) is a cephalosporin antibiotic which is active against numerous classes of bacteria, including most skin bacteria. Side effects may include nausea, diarrhea, gastrointestinal upset, rash, hives, yeast infections, and in rare cases, hepatitis, kidney disease, seizures, fever, confusion, neurologic symptoms, and others. Patients with severe allergies to penicillin medications are cautioned that there is about a 10% incidence of cross-reactivity with cephalosporins. When possible, patients with penicillin allergies should use alternatives to cephalosporins for antibiotic therapy.
Vtama Pregnancy And Lactation Text: It is unknown if this medication can cause problems during pregnancy and breastfeeding.
Clofazimine Pregnancy And Lactation Text: This medication is Pregnancy Category C and isn't considered safe during pregnancy. It is excreted in breast milk.
Infliximab Counseling:  I discussed with the patient the risks of infliximab including but not limited to myelosuppression, immunosuppression, autoimmune hepatitis, demyelinating diseases, lymphoma, and serious infections.  The patient understands that monitoring is required including a PPD at baseline and must alert us or the primary physician if symptoms of infection or other concerning signs are noted.
Minoxidil Counseling: Minoxidil is a topical medication which can increase blood flow where it is applied. It is uncertain how this medication increases hair growth. Side effects are uncommon and include stinging and allergic reactions.
Protopic Pregnancy And Lactation Text: This medication is Pregnancy Category C. It is unknown if this medication is excreted in breast milk when applied topically.
Topical Retinoid counseling:  Patient advised to apply a pea-sized amount only at bedtime and wait 30 minutes after washing their face before applying.  If too drying, patient may add a non-comedogenic moisturizer. The patient verbalized understanding of the proper use and possible adverse effects of retinoids.  All of the patient's questions and concerns were addressed.
Carac Counseling:  I discussed with the patient the risks of Carac including but not limited to erythema, scaling, itching, weeping, crusting, and pain.
Birth Control Pills Pregnancy And Lactation Text: This medication should be avoided if pregnant and for the first 30 days post-partum.
Topical Steroids Counseling: I discussed with the patient that prolonged use of topical steroids can result in the increased appearance of superficial blood vessels (telangiectasias), lightening (hypopigmentation) and thinning of the skin (atrophy).  Patient understands to avoid using high potency steroids in skin folds, the groin or the face.  The patient verbalized understanding of the proper use and possible adverse effects of topical steroids.  All of the patient's questions and concerns were addressed.
Sski Pregnancy And Lactation Text: This medication is Pregnancy Category D and isn't considered safe during pregnancy. It is excreted in breast milk.
Minocycline Counseling: Patient advised regarding possible photosensitivity and discoloration of the teeth, skin, lips, tongue and gums.  Patient instructed to avoid sunlight, if possible.  When exposed to sunlight, patients should wear protective clothing, sunglasses, and sunscreen.  The patient was instructed to call the office immediately if the following severe adverse effects occur:  hearing changes, easy bruising/bleeding, severe headache, or vision changes.  The patient verbalized understanding of the proper use and possible adverse effects of minocycline.  All of the patient's questions and concerns were addressed.
Qbrexza Counseling:  I discussed with the patient the risks of Qbrexza including but not limited to headache, mydriasis, blurred vision, dry eyes, nasal dryness, dry mouth, dry throat, dry skin, urinary hesitation, and constipation.  Local skin reactions including erythema, burning, stinging, and itching can also occur.
Cyclosporine Counseling:  I discussed with the patient the risks of cyclosporine including but not limited to hypertension, gingival hyperplasia,myelosuppression, immunosuppression, liver damage, kidney damage, neurotoxicity, lymphoma, and serious infections. The patient understands that monitoring is required including baseline blood pressure, CBC, CMP, lipid panel and uric acid, and then 1-2 times monthly CMP and blood pressure.
Colchicine Counseling:  Patient counseled regarding adverse effects including but not limited to stomach upset (nausea, vomiting, stomach pain, or diarrhea).  Patient instructed to limit alcohol consumption while taking this medication.  Colchicine may reduce blood counts especially with prolonged use.  The patient understands that monitoring of kidney function and blood counts may be required, especially at baseline. The patient verbalized understanding of the proper use and possible adverse effects of colchicine.  All of the patient's questions and concerns were addressed.
Cephalexin Pregnancy And Lactation Text: This medication is Pregnancy Category B and considered safe during pregnancy.  It is also excreted in breast milk but can be used safely for shorter doses.
Terbinafine Counseling: Patient counseling regarding adverse effects of terbinafine including but not limited to headache, diarrhea, rash, upset stomach, liver function test abnormalities, itching, taste/smell disturbance, nausea, abdominal pain, and flatulence.  There is a rare possibility of liver failure that can occur when taking terbinafine.  The patient understands that a baseline LFT and kidney function test may be required. The patient verbalized understanding of the proper use and possible adverse effects of terbinafine.  All of the patient's questions and concerns were addressed.
Aklief counseling:  Patient advised to apply a pea-sized amount only at bedtime and wait 30 minutes after washing their face before applying.  If too drying, patient may add a non-comedogenic moisturizer.  The most commonly reported side effects including irritation, redness, scaling, dryness, stinging, burning, itching, and increased risk of sunburn.  The patient verbalized understanding of the proper use and possible adverse effects of retinoids.  All of the patient's questions and concerns were addressed.
Litfulo Pregnancy And Lactation Text: Based on animal studies, Lifulo may cause embryo-fetal harm when administered to pregnant women.  The medication should not be used in pregnancy.  Breastfeeding is not recommended during treatment.
Hydroxychloroquine Pregnancy And Lactation Text: This medication has been shown to cause fetal harm but it isn't assigned a Pregnancy Risk Category. There are small amounts excreted in breast milk.
Taltz Counseling: I discussed with the patient the risks of ixekizumab including but not limited to immunosuppression, serious infections, worsening of inflammatory bowel disease and drug reactions.  The patient understands that monitoring is required including a PPD at baseline and must alert us or the primary physician if symptoms of infection or other concerning signs are noted.
Carac Pregnancy And Lactation Text: This medication is Pregnancy Category X and contraindicated in pregnancy and in women who may become pregnant. It is unknown if this medication is excreted in breast milk.
Terbinafine Pregnancy And Lactation Text: This medication is Pregnancy Category B and is considered safe during pregnancy. It is also excreted in breast milk and breast feeding isn't recommended.
Acitretin Counseling:  I discussed with the patient the risks of acitretin including but not limited to hair loss, dry lips/skin/eyes, liver damage, hyperlipidemia, depression/suicidal ideation, photosensitivity.  Serious rare side effects can include but are not limited to pancreatitis, pseudotumor cerebri, bony changes, clot formation/stroke/heart attack.  Patient understands that alcohol is contraindicated since it can result in liver toxicity and significantly prolong the elimination of the drug by many years.
Spironolactone Counseling: Patient advised regarding risks of diarrhea, abdominal pain, hyperkalemia, birth defects (for female patients), liver toxicity and renal toxicity. The patient may need blood work to monitor liver and kidney function and potassium levels while on therapy. The patient verbalized understanding of the proper use and possible adverse effects of spironolactone.  All of the patient's questions and concerns were addressed.
Dupixent Counseling: I discussed with the patient the risks of dupilumab including but not limited to eye infection and irritation, cold sores, injection site reactions, worsening of asthma, allergic reactions and increased risk of parasitic infection.  Live vaccines should be avoided while taking dupilumab. Dupilumab will also interact with certain medications such as warfarin and cyclosporine. The patient understands that monitoring is required and they must alert us or the primary physician if symptoms of infection or other concerning signs are noted.
Zoryve Counseling:  I discussed with the patient that Zoryve is not for use in the eyes, mouth or vagina. The most commonly reported side effects include diarrhea, headache, insomnia, application site pain, upper respiratory tract infections, and urinary tract infections.  All of the patient's questions and concerns were addressed.
Oral Minoxidil Counseling- I discussed with the patient the risks of oral minoxidil including but not limited to shortness of breath, swelling of the feet or ankles, dizziness, lightheadedness, unwanted hair growth and allergic reaction.  The patient verbalized understanding of the proper use and possible adverse effects of oral minoxidil.  All of the patient's questions and concerns were addressed.
Libtayo Pregnancy And Lactation Text: This medication is contraindicated in pregnancy and when breast feeding.
Eucrisa Counseling: Patient may experience a mild burning sensation during topical application. Eucrisa is not approved in children less than 3 months of age.
Minoxidil Pregnancy And Lactation Text: This medication has not been assigned a Pregnancy Risk Category but animal studies failed to show danger with the topical medication. It is unknown if the medication is excreted in breast milk.
Cyclosporine Pregnancy And Lactation Text: This medication is Pregnancy Category C and it isn't know if it is safe during pregnancy. This medication is excreted in breast milk.
Albendazole Pregnancy And Lactation Text: This medication is Pregnancy Category C and it isn't known if it is safe during pregnancy. It is also excreted in breast milk.
Tazorac Counseling:  Patient advised that medication is irritating and drying.  Patient may need to apply sparingly and wash off after an hour before eventually leaving it on overnight.  The patient verbalized understanding of the proper use and possible adverse effects of tazorac.  All of the patient's questions and concerns were addressed.
Fluconazole Counseling:  Patient counseled regarding adverse effects of fluconazole including but not limited to headache, diarrhea, nausea, upset stomach, liver function test abnormalities, taste disturbance, and stomach pain.  There is a rare possibility of liver failure that can occur when taking fluconazole.  The patient understands that monitoring of LFTs and kidney function test may be required, especially at baseline. The patient verbalized understanding of the proper use and possible adverse effects of fluconazole.  All of the patient's questions and concerns were addressed.
Olumiant Counseling: I discussed with the patient the risks of Olumiant therapy including but not limited to upper respiratory tract infections, shingles, cold sores, and nausea. Live vaccines should be avoided.  This medication has been linked to serious infections; higher rate of mortality; malignancy and lymphoproliferative disorders; major adverse cardiovascular events; thrombosis; gastrointestinal perforations; neutropenia; lymphopenia; anemia; liver enzyme elevations; and lipid elevations.
Mirvaso Counseling: Mirvaso is a topical medication which can decrease superficial blood flow where applied. Side effects are uncommon and include stinging, redness and allergic reactions.
Qbrexza Pregnancy And Lactation Text: There is no available data on Qbrexza use in pregnant women.  There is no available data on Qbrexza use in lactation.
Low Dose Naltrexone Counseling- I discussed with the patient the potential risks and side effects of low dose naltrexone including but not limited to: more vivid dreams, headaches, nausea, vomiting, abdominal pain, fatigue, dizziness, and anxiety.
Thalidomide Counseling: I discussed with the patient the risks of thalidomide including but not limited to birth defects, anxiety, weakness, chest pain, dizziness, cough and severe allergy.
Calcipotriene Counseling:  I discussed with the patient the risks of calcipotriene including but not limited to erythema, scaling, itching, and irritation.
Odomzo Counseling- I discussed with the patient the risks of Odomzo including but not limited to nausea, vomiting, diarrhea, constipation, weight loss, changes in the sense of taste, decreased appetite, muscle spasms, and hair loss.  The patient verbalized understanding of the proper use and possible adverse effects of Odomzo.  All of the patient's questions and concerns were addressed.
Aklief Pregnancy And Lactation Text: It is unknown if this medication is safe to use during pregnancy.  It is unknown if this medication is excreted in breast milk.  Breastfeeding women should use the topical cream on the smallest area of the skin for the shortest time needed while breastfeeding.  Do not apply to nipple and areola.
Oral Minoxidil Pregnancy And Lactation Text: This medication should only be used when clearly needed if you are pregnant, attempting to become pregnant or breast feeding.
Acitretin Pregnancy And Lactation Text: This medication is Pregnancy Category X and should not be given to women who are pregnant or may become pregnant in the future. This medication is excreted in breast milk.
Topical Steroids Applications Pregnancy And Lactation Text: Most topical steroids are considered safe to use during pregnancy and lactation.  Any topical steroid applied to the breast or nipple should be washed off before breastfeeding.
Clindamycin Counseling: I counseled the patient regarding use of clindamycin as an antibiotic for prophylactic and/or therapeutic purposes. Clindamycin is active against numerous classes of bacteria, including skin bacteria. Side effects may include nausea, diarrhea, gastrointestinal upset, rash, hives, yeast infections, and in rare cases, colitis.
Methotrexate Counseling:  Patient counseled regarding adverse effects of methotrexate including but not limited to nausea, vomiting, abnormalities in liver function tests. Patients may develop mouth sores, rash, diarrhea, and abnormalities in blood counts. The patient understands that monitoring is required including LFT's and blood counts.  There is a rare possibility of scarring of the liver and lung problems that can occur when taking methotrexate. Persistent nausea, loss of appetite, pale stools, dark urine, cough, and shortness of breath should be reported immediately. Patient advised to discontinue methotrexate treatment at least three months before attempting to become pregnant.  I discussed the need for folate supplements while taking methotrexate.  These supplements can decrease side effects during methotrexate treatment. The patient verbalized understanding of the proper use and possible adverse effects of methotrexate.  All of the patient's questions and concerns were addressed.
Spironolactone Pregnancy And Lactation Text: This medication can cause feminization of the male fetus and should be avoided during pregnancy. The active metabolite is also found in breast milk.
Topical Sulfur Applications Counseling: Topical Sulfur Counseling: Patient counseled that this medication may cause skin irritation or allergic reactions.  In the event of skin irritation, the patient was advised to reduce the amount of the drug applied or use it less frequently.   The patient verbalized understanding of the proper use and possible adverse effects of topical sulfur application.  All of the patient's questions and concerns were addressed.
Dupixent Pregnancy And Lactation Text: This medication likely crosses the placenta but the risk for the fetus is uncertain. This medication is excreted in breast milk.
Olumiant Pregnancy And Lactation Text: Based on animal studies, Olumiant may cause embryo-fetal harm when administered to pregnant women.  The medication should not be used in pregnancy.  Breastfeeding is not recommended during treatment.
Ivermectin Counseling:  Patient instructed to take medication on an empty stomach with a full glass of water.  Patient informed of potential adverse effects including but not limited to nausea, diarrhea, dizziness, itching, and swelling of the extremities or lymph nodes.  The patient verbalized understanding of the proper use and possible adverse effects of ivermectin.  All of the patient's questions and concerns were addressed.
Tremfya Counseling: I discussed with the patient the risks of guselkumab including but not limited to immunosuppression, serious infections, and drug reactions.  The patient understands that monitoring is required including a PPD at baseline and must alert us or the primary physician if symptoms of infection or other concerning signs are noted.
Quinolones Counseling:  I discussed with the patient the risks of fluoroquinolones including but not limited to GI upset, allergic reaction, drug rash, diarrhea, dizziness, photosensitivity, yeast infections, liver function test abnormalities, tendonitis/tendon rupture.
Calcipotriene Pregnancy And Lactation Text: The use of this medication during pregnancy or lactation is not recommended as there is insufficient data.
Rituxan Counseling:  I discussed with the patient the risks of Rituxan infusions. Side effects can include infusion reactions, severe drug rashes including mucocutaneous reactions, reactivation of latent hepatitis and other infections and rarely progressive multifocal leukoencephalopathy.  All of the patient's questions and concerns were addressed.
Otezla Counseling: The side effects of Otezla were discussed with the patient, including but not limited to worsening or new depression, weight loss, diarrhea, nausea, upper respiratory tract infection, and headache. Patient instructed to call the office should any adverse effect occur.  The patient verbalized understanding of the proper use and possible adverse effects of Otezla.  All the patient's questions and concerns were addressed.
Cimetidine Counseling:  I discussed with the patient the risks of Cimetidine including but not limited to gynecomastia, headache, diarrhea, nausea, drowsiness, arrhythmias, pancreatitis, skin rashes, psychosis, bone marrow suppression and kidney toxicity.
Low Dose Naltrexone Pregnancy And Lactation Text: Naltrexone is pregnancy category C.  There have been no adequate and well-controlled studies in pregnant women.  It should be used in pregnancy only if the potential benefit justifies the potential risk to the fetus.   Limited data indicates that naltrexone is minimally excreted into breastmilk.
Clindamycin Pregnancy And Lactation Text: This medication can be used in pregnancy if certain situations. Clindamycin is also present in breast milk.
Azithromycin Counseling:  I discussed with the patient the risks of azithromycin including but not limited to GI upset, allergic reaction, drug rash, diarrhea, and yeast infections.
Rituxan Pregnancy And Lactation Text: This medication is Pregnancy Category C and it isn't know if it is safe during pregnancy. It is unknown if this medication is excreted in breast milk but similar antibodies are known to be excreted.
Bexarotene Counseling:  I discussed with the patient the risks of bexarotene including but not limited to hair loss, dry lips/skin/eyes, liver abnormalities, hyperlipidemia, pancreatitis, depression/suicidal ideation, photosensitivity, drug rash/allergic reactions, hypothyroidism, anemia, leukopenia, infection, cataracts, and teratogenicity.  Patient understands that they will need regular blood tests to check lipid profile, liver function tests, white blood cell count, thyroid function tests and pregnancy test if applicable.
Tazorac Pregnancy And Lactation Text: This medication is not safe during pregnancy. It is unknown if this medication is excreted in breast milk.
Hydroquinone Counseling:  Patient advised that medication may result in skin irritation, lightening (hypopigmentation), dryness, and burning.  In the event of skin irritation, the patient was advised to reduce the amount of the drug applied or use it less frequently.  Rarely, spots that are treated with hydroquinone can become darker (pseudoochronosis).  Should this occur, patient instructed to stop medication and call the office. The patient verbalized understanding of the proper use and possible adverse effects of hydroquinone.  All of the patient's questions and concerns were addressed.
Mirvaso Pregnancy And Lactation Text: This medication has not been assigned a Pregnancy Risk Category. It is unknown if the medication is excreted in breast milk.
Azelaic Acid Counseling: Patient counseled that medicine may cause skin irritation and to avoid applying near the eyes.  In the event of skin irritation, the patient was advised to reduce the amount of the drug applied or use it less frequently.   The patient verbalized understanding of the proper use and possible adverse effects of azelaic acid.  All of the patient's questions and concerns were addressed.
Rhofade Counseling: Rhofade is a topical medication which can decrease superficial blood flow where applied. Side effects are uncommon and include stinging, redness and allergic reactions.
Dapsone Counseling: I discussed with the patient the risks of dapsone including but not limited to hemolytic anemia, agranulocytosis, rashes, methemoglobinemia, kidney failure, peripheral neuropathy, headaches, GI upset, and liver toxicity.  Patients who start dapsone require monitoring including baseline LFTs and weekly CBCs for the first month, then every month thereafter.  The patient verbalized understanding of the proper use and possible adverse effects of dapsone.  All of the patient's questions and concerns were addressed.
Topical Sulfur Applications Pregnancy And Lactation Text: This medication is considered safe during pregnancy and breast feeding secondary to limited systemic absorption.
Enbrel Counseling:  I discussed with the patient the risks of etanercept including but not limited to myelosuppression, immunosuppression, autoimmune hepatitis, demyelinating diseases, lymphoma, and infections.  The patient understands that monitoring is required including a PPD at baseline and must alert us or the primary physician if symptoms of infection or other concerning signs are noted.
Griseofulvin Counseling:  I discussed with the patient the risks of griseofulvin including but not limited to photosensitivity, cytopenia, liver damage, nausea/vomiting and severe allergy.  The patient understands that this medication is best absorbed when taken with a fatty meal (e.g., ice cream or french fries).
Topical Clindamycin Counseling: Patient counseled that this medication may cause skin irritation or allergic reactions.  In the event of skin irritation, the patient was advised to reduce the amount of the drug applied or use it less frequently.   The patient verbalized understanding of the proper use and possible adverse effects of clindamycin.  All of the patient's questions and concerns were addressed.
Cantharidin Counseling:  I discussed with the patient the risks of Cantharidin including but not limited to pain, redness, burning, itching, and blistering.
Methotrexate Pregnancy And Lactation Text: This medication is Pregnancy Category X and is known to cause fetal harm. This medication is excreted in breast milk.
Doxycycline Counseling:  Patient counseled regarding possible photosensitivity and increased risk for sunburn.  Patient instructed to avoid sunlight, if possible.  When exposed to sunlight, patients should wear protective clothing, sunglasses, and sunscreen.  The patient was instructed to call the office immediately if the following severe adverse effects occur:  hearing changes, easy bruising/bleeding, severe headache, or vision changes.  The patient verbalized understanding of the proper use and possible adverse effects of doxycycline.  All of the patient's questions and concerns were addressed.
Niacinamide Counseling: I recommended taking niacin or niacinamide, also know as vitamin B3, twice daily. Recent evidence suggests that taking vitamin B3 (500 mg twice daily) can reduce the risk of actinic keratoses and non-melanoma skin cancers. Side effects of vitamin B3 include flushing and headache.
Cantharidin Pregnancy And Lactation Text: This medication has not been proven safe during pregnancy. It is unknown if this medication is excreted in breast milk.
Azathioprine Counseling:  I discussed with the patient the risks of azathioprine including but not limited to myelosuppression, immunosuppression, hepatotoxicity, lymphoma, and infections.  The patient understands that monitoring is required including baseline LFTs, Creatinine, possible TPMP genotyping and weekly CBCs for the first month and then every 2 weeks thereafter.  The patient verbalized understanding of the proper use and possible adverse effects of azathioprine.  All of the patient's questions and concerns were addressed.
Tranexamic Acid Counseling:  Patient advised of the small risk of bleeding problems with tranexamic acid. They were also instructed to call if they developed any nausea, vomiting or diarrhea. All of the patient's questions and concerns were addressed.
Zyclara Counseling:  I discussed with the patient the risks of imiquimod including but not limited to erythema, scaling, itching, weeping, crusting, and pain.  Patient understands that the inflammatory response to imiquimod is variable from person to person and was educated regarded proper titration schedule.  If flu-like symptoms develop, patient knows to discontinue the medication and contact us.
Azithromycin Pregnancy And Lactation Text: This medication is considered safe during pregnancy and is also secreted in breast milk.
Rinvoq Counseling: I discussed with the patient the risks of Rinvoq therapy including but not limited to upper respiratory tract infections, shingles, cold sores, bronchitis, nausea, cough, fever, acne, and headache. Live vaccines should be avoided.  This medication has been linked to serious infections; higher rate of mortality; malignancy and lymphoproliferative disorders; major adverse cardiovascular events; thrombosis; thrombocytopenia, anemia, and neutropenia; lipid elevations; liver enzyme elevations; and gastrointestinal perforations.
Bexarotene Pregnancy And Lactation Text: This medication is Pregnancy Category X and should not be given to women who are pregnant or may become pregnant. This medication should not be used if you are breast feeding.
Dapsone Pregnancy And Lactation Text: This medication is Pregnancy Category C and is not considered safe during pregnancy or breast feeding.
Siliq Counseling:  I discussed with the patient the risks of Siliq including but not limited to new or worsening depression, suicidal thoughts and behavior, immunosuppression, malignancy, posterior leukoencephalopathy syndrome, and serious infections.  The patient understands that monitoring is required including a PPD at baseline and must alert us or the primary physician if symptoms of infection or other concerning signs are noted. There is also a special program designed to monitor depression which is required with Siliq.
Otezla Pregnancy And Lactation Text: This medication is Pregnancy Category C and it isn't known if it is safe during pregnancy. It is unknown if it is excreted in breast milk.
Dutasteride Male Counseling: Dustasteride Counseling:  I discussed with the patient the risks of use of dutasteride including but not limited to decreased libido, decreased ejaculate volume, and gynecomastia. Women who can become pregnant should not handle medication.  All of the patient's questions and concerns were addressed.
Opzelura Counseling:  I discussed with the patient the risks of Opzelura including but not limited to nasopharngitis, bronchitis, ear infection, eosinophila, hives, diarrhea, folliculitis, tonsillitis, and rhinorrhea.  Taken orally, this medication has been linked to serious infections; higher rate of mortality; malignancy and lymphoproliferative disorders; major adverse cardiovascular events; thrombosis; thrombocytopenia, anemia, and neutropenia; and lipid elevations.
5-Fu Counseling: 5-Fluorouracil Counseling:  I discussed with the patient the risks of 5-fluorouracil including but not limited to erythema, scaling, itching, weeping, crusting, and pain.
Bactrim Counseling:  I discussed with the patient the risks of sulfa antibiotics including but not limited to GI upset, allergic reaction, drug rash, diarrhea, dizziness, photosensitivity, and yeast infections.  Rarely, more serious reactions can occur including but not limited to aplastic anemia, agranulocytosis, methemoglobinemia, blood dyscrasias, liver or kidney failure, lung infiltrates or desquamative/blistering drug rashes.
Azathioprine Pregnancy And Lactation Text: This medication is Pregnancy Category D and isn't considered safe during pregnancy. It is unknown if this medication is excreted in breast milk.
Tranexamic Acid Pregnancy And Lactation Text: It is unknown if this medication is safe during pregnancy or breast feeding.
Adbry Counseling: I discussed with the patient the risks of tralokinumab including but not limited to eye infection and irritation, cold sores, injection site reactions, worsening of asthma, allergic reactions and increased risk of parasitic infection.  Live vaccines should be avoided while taking tralokinumab. The patient understands that monitoring is required and they must alert us or the primary physician if symptoms of infection or other concerning signs are noted.
Rifampin Counseling: I discussed with the patient the risks of rifampin including but not limited to liver damage, kidney damage, red-orange body fluids, nausea/vomiting and severe allergy.
Griseofulvin Pregnancy And Lactation Text: This medication is Pregnancy Category X and is known to cause serious birth defects. It is unknown if this medication is excreted in breast milk but breast feeding should be avoided.
Rinvoq Pregnancy And Lactation Text: Based on animal studies, Rinvoq may cause embryo-fetal harm when administered to pregnant women.  The medication should not be used in pregnancy.  Breastfeeding is not recommended during treatment and for 6 days after the last dose.
Doxepin Counseling:  Patient advised that the medication is sedating and not to drive a car after taking this medication. Patient informed of potential adverse effects including but not limited to dry mouth, urinary retention, and blurry vision.  The patient verbalized understanding of the proper use and possible adverse effects of doxepin.  All of the patient's questions and concerns were addressed.
Prednisone Counseling:  I discussed with the patient the risks of prolonged use of prednisone including but not limited to weight gain, insomnia, osteoporosis, mood changes, diabetes, susceptibility to infection, glaucoma and high blood pressure.  In cases where prednisone use is prolonged, patients should be monitored with blood pressure checks, serum glucose levels and an eye exam.  Additionally, the patient may need to be placed on GI prophylaxis, PCP prophylaxis, and calcium and vitamin D supplementation and/or a bisphosphonate.  The patient verbalized understanding of the proper use and the possible adverse effects of prednisone.  All of the patient's questions and concerns were addressed.
Gabapentin Counseling: I discussed with the patient the risks of gabapentin including but not limited to dizziness, somnolence, fatigue and ataxia.
Doxycycline Pregnancy And Lactation Text: This medication is Pregnancy Category D and not consider safe during pregnancy. It is also excreted in breast milk but is considered safe for shorter treatment courses.
Wartpeel Counseling:  I discussed with the patient the risks of Wartpeel including but not limited to erythema, scaling, itching, weeping, crusting, and pain.
Xolair Counseling:  Patient informed of potential adverse effects including but not limited to fever, muscle aches, rash and allergic reactions.  The patient verbalized understanding of the proper use and possible adverse effects of Xolair.  All of the patient's questions and concerns were addressed.
Niacinamide Pregnancy And Lactation Text: These medications are considered safe during pregnancy.
Isotretinoin Counseling: Patient should get monthly blood tests, not donate blood, not drive at night if vision affected, not share medication, and not undergo elective surgery for 6 months after tx completed. Side effects reviewed, pt to contact office should one occur.
Humira Counseling:  I discussed with the patient the risks of adalimumab including but not limited to myelosuppression, immunosuppression, autoimmune hepatitis, demyelinating diseases, lymphoma, and serious infections.  The patient understands that monitoring is required including a PPD at baseline and must alert us or the primary physician if symptoms of infection or other concerning signs are noted.
Imiquimod Counseling:  I discussed with the patient the risks of imiquimod including but not limited to erythema, scaling, itching, weeping, crusting, and pain.  Patient understands that the inflammatory response to imiquimod is variable from person to person and was educated regarded proper titration schedule.  If flu-like symptoms develop, patient knows to discontinue the medication and contact us.
Oxybutynin Counseling:  I discussed with the patient the risks of oxybutynin including but not limited to skin rash, drowsiness, dry mouth, difficulty urinating, and blurred vision.
Dutasteride Pregnancy And Lactation Text: This medication is absolutely contraindicated in women, especially during pregnancy and breast feeding. Feminization of male fetuses is possible if taking while pregnant.
Rifampin Pregnancy And Lactation Text: This medication is Pregnancy Category C and it isn't know if it is safe during pregnancy. It is also excreted in breast milk and should not be used if you are breast feeding.
Opzelura Pregnancy And Lactation Text: There is insufficient data to evaluate drug-associated risk for major birth defects, miscarriage, or other adverse maternal or fetal outcomes.  There is a pregnancy registry that monitors pregnancy outcomes in pregnant persons exposed to the medication during pregnancy.  It is unknown if this medication is excreted in breast milk.  Do not breastfeed during treatment and for about 4 weeks after the last dose.
Solaraze Counseling:  I discussed with the patient the risks of Solaraze including but not limited to erythema, scaling, itching, weeping, crusting, and pain.
Topical Ketoconazole Counseling: Patient counseled that this medication may cause skin irritation or allergic reactions.  In the event of skin irritation, the patient was advised to reduce the amount of the drug applied or use it less frequently.   The patient verbalized understanding of the proper use and possible adverse effects of ketoconazole.  All of the patient's questions and concerns were addressed.
Doxepin Pregnancy And Lactation Text: This medication is Pregnancy Category C and it isn't known if it is safe during pregnancy. It is also excreted in breast milk and breast feeding isn't recommended.
Xolair Pregnancy And Lactation Text: This medication is Pregnancy Category B and is considered safe during pregnancy. This medication is excreted in breast milk.
Nsaids Counseling: NSAID Counseling: I discussed with the patient that NSAIDs should be taken with food. Prolonged use of NSAIDs can result in the development of stomach ulcers.  Patient advised to stop taking NSAIDs if abdominal pain occurs.  The patient verbalized understanding of the proper use and possible adverse effects of NSAIDs.  All of the patient's questions and concerns were addressed.
Opioid Counseling: I discussed with the patient the potential side effects of opioids including but not limited to addiction, altered mental status, and depression. I stressed avoiding alcohol, benzodiazepines, muscle relaxants and sleep aids unless specifically okayed by a physician. The patient verbalized understanding of the proper use and possible adverse effects of opioids. All of the patient's questions and concerns were addressed. They were instructed to flush the remaining pills down the toilet if they did not need them for pain.
Sotyktu Counseling:  I discussed the most common side effects of Sotyktu including: common cold, sore throat, sinus infections, cold sores, canker sores, folliculitis, and acne.  I also discussed more serious side effects of Sotyktu including but not limited to: serious allergic reactions; increased risk for infections such as TB; cancers such as lymphomas; rhabdomyolysis and elevated CPK; and elevated triglycerides and liver enzymes. 
Picato Counseling:  I discussed with the patient the risks of Picato including but not limited to erythema, scaling, itching, weeping, crusting, and pain.
Solaraze Pregnancy And Lactation Text: This medication is Pregnancy Category B and is considered safe. There is some data to suggest avoiding during the third trimester. It is unknown if this medication is excreted in breast milk.
Bactrim Pregnancy And Lactation Text: This medication is Pregnancy Category D and is known to cause fetal risk.  It is also excreted in breast milk.
Cellcept Counseling:  I discussed with the patient the risks of mycophenolate mofetil including but not limited to infection/immunosuppression, GI upset, hypokalemia, hypercholesterolemia, bone marrow suppression, lymphoproliferative disorders, malignancy, GI ulceration/bleed/perforation, colitis, interstitial lung disease, kidney failure, progressive multifocal leukoencephalopathy, and birth defects.  The patient understands that monitoring is required including a baseline creatinine and regular CBC testing. In addition, patient must alert us immediately if symptoms of infection or other concerning signs are noted.
Valtrex Counseling: I discussed with the patient the risks of valacyclovir including but not limited to kidney damage, nausea, vomiting and severe allergy.  The patient understands that if the infection seems to be worsening or is not improving, they are to call.
Adbry Pregnancy And Lactation Text: It is unknown if this medication will adversely affect pregnancy or breast feeding.
Isotretinoin Pregnancy And Lactation Text: This medication is Pregnancy Category X and is considered extremely dangerous during pregnancy. It is unknown if it is excreted in breast milk.
Skyrizi Counseling: I discussed with the patient the risks of risankizumab-rzaa including but not limited to immunosuppression, and serious infections.  The patient understands that monitoring is required including a PPD at baseline and must alert us or the primary physician if symptoms of infection or other concerning signs are noted.
Erythromycin Counseling:  I discussed with the patient the risks of erythromycin including but not limited to GI upset, allergic reaction, drug rash, diarrhea, increase in liver enzymes, and yeast infections.
Itraconazole Counseling:  I discussed with the patient the risks of itraconazole including but not limited to liver damage, nausea/vomiting, neuropathy, and severe allergy.  The patient understands that this medication is best absorbed when taken with acidic beverages such as non-diet cola or ginger ale.  The patient understands that monitoring is required including baseline LFTs and repeat LFTs at intervals.  The patient understands that they are to contact us or the primary physician if concerning signs are noted.
Arava Counseling:  Patient counseled regarding adverse effects of Arava including but not limited to nausea, vomiting, abnormalities in liver function tests. Patients may develop mouth sores, rash, diarrhea, and abnormalities in blood counts. The patient understands that monitoring is required including LFTs and blood counts.  There is a rare possibility of scarring of the liver and lung problems that can occur when taking methotrexate. Persistent nausea, loss of appetite, pale stools, dark urine, cough, and shortness of breath should be reported immediately. Patient advised to discontinue Arava treatment and consult with a physician prior to attempting conception. The patient will have to undergo a treatment to eliminate Arava from the body prior to conception.
Cimzia Counseling:  I discussed with the patient the risks of Cimzia including but not limited to immunosuppression, allergic reactions and infections.  The patient understands that monitoring is required including a PPD at baseline and must alert us or the primary physician if symptoms of infection or other concerning signs are noted.
Winlevi Counseling:  I discussed with the patient the risks of topical clascoterone including but not limited to erythema, scaling, itching, and stinging. Patient voiced their understanding.
Drysol Counseling:  I discussed with the patient the risks of drysol/aluminum chloride including but not limited to skin rash, itching, irritation, burning.
Sotyktu Pregnancy And Lactation Text: There is insufficient data to evaluate whether or not Sotyktu is safe to use during pregnancy.   It is not known if Sotyktu passes into breast milk and whether or not it is safe to use when breastfeeding.  
Nsaids Pregnancy And Lactation Text: These medications are considered safe up to 30 weeks gestation. It is excreted in breast milk.
Simponi Counseling:  I discussed with the patient the risks of golimumab including but not limited to myelosuppression, immunosuppression, autoimmune hepatitis, demyelinating diseases, lymphoma, and serious infections.  The patient understands that monitoring is required including a PPD at baseline and must alert us or the primary physician if symptoms of infection or other concerning signs are noted.
Valtrex Pregnancy And Lactation Text: this medication is Pregnancy Category B and is considered safe during pregnancy. This medication is not directly found in breast milk but it's metabolite acyclovir is present.
Sarecycline Counseling: Patient advised regarding possible photosensitivity and discoloration of the teeth, skin, lips, tongue and gums.  Patient instructed to avoid sunlight, if possible.  When exposed to sunlight, patients should wear protective clothing, sunglasses, and sunscreen.  The patient was instructed to call the office immediately if the following severe adverse effects occur:  hearing changes, easy bruising/bleeding, severe headache, or vision changes.  The patient verbalized understanding of the proper use and possible adverse effects of sarecycline.  All of the patient's questions and concerns were addressed.
Finasteride Male Counseling: Finasteride Counseling:  I discussed with the patient the risks of use of finasteride including but not limited to decreased libido, decreased ejaculate volume, gynecomastia, and depression. Women should not handle medication.  All of the patient's questions and concerns were addressed.

## 2024-01-29 ENCOUNTER — TRANSCRIBE ORDERS (OUTPATIENT)
Dept: SCHEDULING | Age: 39
End: 2024-01-29

## 2024-01-29 DIAGNOSIS — O36.80X0 PREGNANCY WITH INCONCLUSIVE FETAL VIABILITY, NOT APPLICABLE OR UNSPECIFIED: Primary | ICD-10-CM

## 2024-02-01 ENCOUNTER — HOSPITAL ENCOUNTER (OUTPATIENT)
Dept: RADIOLOGY | Facility: HOSPITAL | Age: 39
Discharge: HOME | End: 2024-02-01
Attending: STUDENT IN AN ORGANIZED HEALTH CARE EDUCATION/TRAINING PROGRAM
Payer: COMMERCIAL

## 2024-02-01 DIAGNOSIS — O36.80X0 PREGNANCY WITH INCONCLUSIVE FETAL VIABILITY, NOT APPLICABLE OR UNSPECIFIED: ICD-10-CM

## 2024-02-01 PROCEDURE — 76801 OB US < 14 WKS SINGLE FETUS: CPT

## 2024-02-08 ENCOUNTER — APPOINTMENT (RX ONLY)
Dept: URBAN - METROPOLITAN AREA CLINIC 28 | Facility: CLINIC | Age: 39
Setting detail: DERMATOLOGY
End: 2024-02-08

## 2024-02-08 DIAGNOSIS — L65.9 NONSCARRING HAIR LOSS, UNSPECIFIED: ICD-10-CM

## 2024-02-08 PROCEDURE — ? PRESCRIPTION

## 2024-02-08 PROCEDURE — ? PHOTO-DOCUMENTATION

## 2024-02-08 PROCEDURE — ? COUNSELING

## 2024-02-08 PROCEDURE — ? PRESCRIPTION MEDICATION MANAGEMENT

## 2024-02-08 PROCEDURE — 99214 OFFICE O/P EST MOD 30 MIN: CPT

## 2024-02-08 RX ORDER — KETOCONAZOLE 20 MG/ML
SHAMPOO, SUSPENSION TOPICAL
Qty: 120 | Refills: 11 | Status: ERX

## 2024-02-08 ASSESSMENT — LOCATION SIMPLE DESCRIPTION DERM
LOCATION SIMPLE: LEFT SCALP
LOCATION SIMPLE: RIGHT SCALP
LOCATION SIMPLE: ANTERIOR SCALP

## 2024-02-08 ASSESSMENT — LOCATION DETAILED DESCRIPTION DERM
LOCATION DETAILED: MID-FRONTAL SCALP
LOCATION DETAILED: LEFT CENTRAL FRONTAL SCALP
LOCATION DETAILED: RIGHT LATERAL FRONTAL SCALP

## 2024-02-08 ASSESSMENT — LOCATION ZONE DERM: LOCATION ZONE: SCALP

## 2024-02-08 NOTE — PROCEDURE: PRESCRIPTION MEDICATION MANAGEMENT
Continue Regimen: ketoconazole 2% shampoo: Lather up and massage into scalp, let sit for 5-10 minutes before rinsing out. Use 1-2 times a week as shampoo.
Plan: *\\n\\nSuspect patient had telogen effluvium event ~6 months ago following her miscarriage (requiring hospitalization) ~9 months ago. 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 Her hair pull test is negative today indicating she is no longer actively in telogen effluvium. Discussed with patient treatment options for her AGA are limited given she is currently pregnant, but in the future could consider topical or oral minoxidil. Okay to continue ketoconazole shampoo since this is safe during pregnancy and there is some evidence to suggest it is helpful for telogen effluvium and AGA.
Render In Strict Bullet Format?: No
Detail Level: Zone
Discontinue Regimen: clobetasol 0.05% scalp solution

## 2024-02-15 ENCOUNTER — TRANSCRIBE ORDERS (OUTPATIENT)
Dept: SCHEDULING | Age: 39
End: 2024-02-15

## 2024-02-15 DIAGNOSIS — O36.80X0 PREGNANCY WITH INCONCLUSIVE FETAL VIABILITY, NOT APPLICABLE OR UNSPECIFIED: Primary | ICD-10-CM

## 2024-02-22 ENCOUNTER — HOSPITAL ENCOUNTER (OUTPATIENT)
Dept: RADIOLOGY | Facility: HOSPITAL | Age: 39
Discharge: HOME | End: 2024-02-22
Attending: STUDENT IN AN ORGANIZED HEALTH CARE EDUCATION/TRAINING PROGRAM
Payer: COMMERCIAL

## 2024-02-22 DIAGNOSIS — O36.80X0 PREGNANCY WITH INCONCLUSIVE FETAL VIABILITY, NOT APPLICABLE OR UNSPECIFIED: ICD-10-CM

## 2024-02-22 PROCEDURE — 76801 OB US < 14 WKS SINGLE FETUS: CPT

## 2024-02-24 ENCOUNTER — ANESTHESIA (EMERGENCY)
Dept: OPERATING ROOM | Facility: HOSPITAL | Age: 39
End: 2024-02-24
Payer: COMMERCIAL

## 2024-02-24 ENCOUNTER — HOSPITAL ENCOUNTER (EMERGENCY)
Facility: HOSPITAL | Age: 39
Discharge: HOME | End: 2024-02-24
Attending: EMERGENCY MEDICINE | Admitting: OBSTETRICS & GYNECOLOGY
Payer: COMMERCIAL

## 2024-02-24 ENCOUNTER — ANESTHESIA EVENT (EMERGENCY)
Dept: OPERATING ROOM | Facility: HOSPITAL | Age: 39
End: 2024-02-24
Payer: COMMERCIAL

## 2024-02-24 ENCOUNTER — APPOINTMENT (EMERGENCY)
Dept: RADIOLOGY | Facility: HOSPITAL | Age: 39
End: 2024-02-24
Attending: EMERGENCY MEDICINE
Payer: COMMERCIAL

## 2024-02-24 VITALS
DIASTOLIC BLOOD PRESSURE: 50 MMHG | TEMPERATURE: 98.4 F | RESPIRATION RATE: 18 BRPM | BODY MASS INDEX: 24.75 KG/M2 | WEIGHT: 145 LBS | OXYGEN SATURATION: 100 % | HEART RATE: 87 BPM | SYSTOLIC BLOOD PRESSURE: 101 MMHG | HEIGHT: 64 IN

## 2024-02-24 DIAGNOSIS — N93.9 VAGINA BLEEDING: Primary | ICD-10-CM

## 2024-02-24 DIAGNOSIS — O03.1 INCOMPLETE SPONTANEOUS ABORTION WITH DELAYED OR EXCESSIVE HEMORRHAGE: ICD-10-CM

## 2024-02-24 LAB
ABO + RH BLD: NORMAL
ALBUMIN SERPL-MCNC: 4.2 G/DL (ref 3.5–5.7)
ALP SERPL-CCNC: 35 IU/L (ref 34–125)
ALT SERPL-CCNC: 11 IU/L (ref 7–52)
ANION GAP SERPL CALC-SCNC: 8 MEQ/L (ref 3–15)
AST SERPL-CCNC: 11 IU/L (ref 13–39)
BASOPHILS # BLD: 0.02 K/UL (ref 0.01–0.1)
BASOPHILS NFR BLD: 0.2 %
BILIRUB SERPL-MCNC: 0.4 MG/DL (ref 0.3–1.2)
BLD GP AB SCN SERPL QL: NEGATIVE
BUN SERPL-MCNC: 19 MG/DL (ref 7–25)
CALCIUM SERPL-MCNC: 9 MG/DL (ref 8.6–10.3)
CHLORIDE SERPL-SCNC: 107 MEQ/L (ref 98–107)
CO2 SERPL-SCNC: 22 MEQ/L (ref 21–31)
CREAT SERPL-MCNC: 0.5 MG/DL (ref 0.6–1.2)
D AG BLD QL: POSITIVE
DIFFERENTIAL METHOD BLD: ABNORMAL
EGFRCR SERPLBLD CKD-EPI 2021: >60 ML/MIN/1.73M*2
EOSINOPHIL # BLD: 0.02 K/UL (ref 0.04–0.36)
EOSINOPHIL NFR BLD: 0.2 %
ERYTHROCYTE [DISTWIDTH] IN BLOOD BY AUTOMATED COUNT: 11.9 % (ref 11.7–14.4)
ERYTHROCYTE [DISTWIDTH] IN BLOOD BY AUTOMATED COUNT: 12.1 % (ref 11.7–14.4)
GLUCOSE SERPL-MCNC: 114 MG/DL (ref 70–99)
HCG SERPL-ACNC: 5582 IU/L (MIU/ML)
HCT VFR BLD AUTO: 24.2 % (ref 35–45)
HCT VFR BLD AUTO: 32.8 % (ref 35–45)
HGB BLD-MCNC: 10.9 G/DL (ref 11.8–15.7)
HGB BLD-MCNC: 8.2 G/DL (ref 11.8–15.7)
IMM GRANULOCYTES # BLD AUTO: 0.05 K/UL (ref 0–0.08)
IMM GRANULOCYTES NFR BLD AUTO: 0.4 %
LABORATORY COMMENT REPORT: NORMAL
LYMPHOCYTES # BLD: 1.85 K/UL (ref 1.2–3.5)
LYMPHOCYTES NFR BLD: 14.8 %
MCH RBC QN AUTO: 30.4 PG (ref 28–33.2)
MCH RBC QN AUTO: 31.7 PG (ref 28–33.2)
MCHC RBC AUTO-ENTMCNC: 33.2 G/DL (ref 32.2–35.5)
MCHC RBC AUTO-ENTMCNC: 33.9 G/DL (ref 32.2–35.5)
MCV RBC AUTO: 91.6 FL (ref 83–98)
MCV RBC AUTO: 93.4 FL (ref 83–98)
MONOCYTES # BLD: 0.35 K/UL (ref 0.28–0.8)
MONOCYTES NFR BLD: 2.8 %
NEUTROPHILS # BLD: 10.21 K/UL (ref 1.7–7)
NEUTS SEG NFR BLD: 81.6 %
NRBC BLD-RTO: 0 %
PDW BLD AUTO: 10.2 FL (ref 9.4–12.3)
PDW BLD AUTO: 10.5 FL (ref 9.4–12.3)
PLATELET # BLD AUTO: 223 K/UL (ref 150–369)
PLATELET # BLD AUTO: 292 K/UL (ref 150–369)
POTASSIUM SERPL-SCNC: 3.9 MEQ/L (ref 3.5–5.1)
PROT SERPL-MCNC: 7 G/DL (ref 6–8.2)
RBC # BLD AUTO: 2.59 M/UL (ref 3.93–5.22)
RBC # BLD AUTO: 3.58 M/UL (ref 3.93–5.22)
SODIUM SERPL-SCNC: 137 MEQ/L (ref 136–145)
SPECIMEN EXP DATE BLD: NORMAL
WBC # BLD AUTO: 11.92 K/UL (ref 3.8–10.5)
WBC # BLD AUTO: 12.5 K/UL (ref 3.8–10.5)

## 2024-02-24 PROCEDURE — 25800000 HC PHARMACY IV SOLUTIONS: Performed by: NURSE ANESTHETIST, CERTIFIED REGISTERED

## 2024-02-24 PROCEDURE — 99285 EMERGENCY DEPT VISIT HI MDM: CPT | Mod: 25,U5

## 2024-02-24 PROCEDURE — 25800000 HC PHARMACY IV SOLUTIONS: Performed by: EMERGENCY MEDICINE

## 2024-02-24 PROCEDURE — 3E0337Z INTRODUCTION OF ELECTROLYTIC AND WATER BALANCE SUBSTANCE INTO PERIPHERAL VEIN, PERCUTANEOUS APPROACH: ICD-10-PCS | Performed by: EMERGENCY MEDICINE

## 2024-02-24 PROCEDURE — 96375 TX/PRO/DX INJ NEW DRUG ADDON: CPT | Mod: 59

## 2024-02-24 PROCEDURE — 36430 TRANSFUSION BLD/BLD COMPNT: CPT

## 2024-02-24 PROCEDURE — 88305 TISSUE EXAM BY PATHOLOGIST: CPT | Performed by: OBSTETRICS & GYNECOLOGY

## 2024-02-24 PROCEDURE — 25000000 HC PHARMACY GENERAL: Performed by: NURSE ANESTHETIST, CERTIFIED REGISTERED

## 2024-02-24 PROCEDURE — 63600000 HC DRUGS/DETAIL CODE: Performed by: OBSTETRICS & GYNECOLOGY

## 2024-02-24 PROCEDURE — 84702 CHORIONIC GONADOTROPIN TEST: CPT | Performed by: EMERGENCY MEDICINE

## 2024-02-24 PROCEDURE — 80053 COMPREHEN METABOLIC PANEL: CPT | Performed by: EMERGENCY MEDICINE

## 2024-02-24 PROCEDURE — 3E033NZ INTRODUCTION OF ANALGESICS, HYPNOTICS, SEDATIVES INTO PERIPHERAL VEIN, PERCUTANEOUS APPROACH: ICD-10-PCS | Performed by: EMERGENCY MEDICINE

## 2024-02-24 PROCEDURE — 96372 THER/PROPH/DIAG INJ SC/IM: CPT | Mod: 59

## 2024-02-24 PROCEDURE — 71000011 HC PACU PHASE 1 EA ADDL MIN: Performed by: OBSTETRICS & GYNECOLOGY

## 2024-02-24 PROCEDURE — 71000002 HC PACU PHASE 2 INITIAL 30MIN: Performed by: OBSTETRICS & GYNECOLOGY

## 2024-02-24 PROCEDURE — 76817 TRANSVAGINAL US OBSTETRIC: CPT

## 2024-02-24 PROCEDURE — 63600000 HC DRUGS/DETAIL CODE: Mod: JZ,JG | Performed by: EMERGENCY MEDICINE

## 2024-02-24 PROCEDURE — 96374 THER/PROPH/DIAG INJ IV PUSH: CPT | Mod: 59

## 2024-02-24 PROCEDURE — P9016 RBC LEUKOCYTES REDUCED: HCPCS

## 2024-02-24 PROCEDURE — 36000002 HC OR LEVEL 2 INITIAL 30MIN: Performed by: OBSTETRICS & GYNECOLOGY

## 2024-02-24 PROCEDURE — 96361 HYDRATE IV INFUSION ADD-ON: CPT | Mod: 59

## 2024-02-24 PROCEDURE — 37000001 HC ANESTHESIA GENERAL: Performed by: OBSTETRICS & GYNECOLOGY

## 2024-02-24 PROCEDURE — 85027 COMPLETE CBC AUTOMATED: CPT | Mod: 59 | Performed by: EMERGENCY MEDICINE

## 2024-02-24 PROCEDURE — 36415 COLL VENOUS BLD VENIPUNCTURE: CPT | Performed by: EMERGENCY MEDICINE

## 2024-02-24 PROCEDURE — 10D17ZZ EXTRACTION OF PRODUCTS OF CONCEPTION, RETAINED, VIA NATURAL OR ARTIFICIAL OPENING: ICD-10-PCS | Performed by: OBSTETRICS & GYNECOLOGY

## 2024-02-24 PROCEDURE — 25800000 HC PHARMACY IV SOLUTIONS: Performed by: ANESTHESIOLOGY

## 2024-02-24 PROCEDURE — 86923 COMPATIBILITY TEST ELECTRIC: CPT

## 2024-02-24 PROCEDURE — 71000001 HC PACU PHASE 1 INITIAL 30MIN: Performed by: OBSTETRICS & GYNECOLOGY

## 2024-02-24 PROCEDURE — 3E023GC INTRODUCTION OF OTHER THERAPEUTIC SUBSTANCE INTO MUSCLE, PERCUTANEOUS APPROACH: ICD-10-PCS | Performed by: EMERGENCY MEDICINE

## 2024-02-24 PROCEDURE — 85025 COMPLETE CBC W/AUTO DIFF WBC: CPT | Performed by: EMERGENCY MEDICINE

## 2024-02-24 PROCEDURE — 63600000 HC DRUGS/DETAIL CODE: Mod: JZ | Performed by: NURSE ANESTHETIST, CERTIFIED REGISTERED

## 2024-02-24 PROCEDURE — 86901 BLOOD TYPING SEROLOGIC RH(D): CPT

## 2024-02-24 RX ORDER — FENTANYL CITRATE 50 UG/ML
50 INJECTION, SOLUTION INTRAMUSCULAR; INTRAVENOUS EVERY 5 MIN PRN
Status: DISCONTINUED | OUTPATIENT
Start: 2024-02-24 | End: 2024-02-24 | Stop reason: HOSPADM

## 2024-02-24 RX ORDER — DOXYCYCLINE HYCLATE 100 MG
200 TABLET ORAL ONCE
Status: CANCELLED | OUTPATIENT
Start: 2024-02-24 | End: 2024-02-25

## 2024-02-24 RX ORDER — DEXTROSE 40 %
15-30 GEL (GRAM) ORAL AS NEEDED
Status: CANCELLED | OUTPATIENT
Start: 2024-02-24

## 2024-02-24 RX ORDER — LIDOCAINE HYDROCHLORIDE 10 MG/ML
INJECTION, SOLUTION INFILTRATION; PERINEURAL AS NEEDED
Status: DISCONTINUED | OUTPATIENT
Start: 2024-02-24 | End: 2024-02-24 | Stop reason: SURG

## 2024-02-24 RX ORDER — MORPHINE SULFATE 2 MG/ML
2 INJECTION, SOLUTION INTRAMUSCULAR; INTRAVENOUS ONCE
Status: COMPLETED | OUTPATIENT
Start: 2024-02-24 | End: 2024-02-24

## 2024-02-24 RX ORDER — OXYCODONE HYDROCHLORIDE 5 MG/1
5 TABLET ORAL EVERY 4 HOURS PRN
Status: CANCELLED | OUTPATIENT
Start: 2024-02-24

## 2024-02-24 RX ORDER — SODIUM CHLORIDE 9 MG/ML
INJECTION, SOLUTION INTRAVENOUS CONTINUOUS PRN
Status: DISCONTINUED | OUTPATIENT
Start: 2024-02-24 | End: 2024-02-24 | Stop reason: SURG

## 2024-02-24 RX ORDER — METHYLERGONOVINE MALEATE 0.2 MG/ML
200 INJECTION INTRAVENOUS ONCE
Status: COMPLETED | OUTPATIENT
Start: 2024-02-24 | End: 2024-02-24

## 2024-02-24 RX ORDER — PROPOFOL 10 MG/ML
INJECTION, EMULSION INTRAVENOUS AS NEEDED
Status: DISCONTINUED | OUTPATIENT
Start: 2024-02-24 | End: 2024-02-24 | Stop reason: SURG

## 2024-02-24 RX ORDER — HYDROMORPHONE HYDROCHLORIDE 1 MG/ML
0.5 INJECTION, SOLUTION INTRAMUSCULAR; INTRAVENOUS; SUBCUTANEOUS
Status: DISCONTINUED | OUTPATIENT
Start: 2024-02-24 | End: 2024-02-24 | Stop reason: HOSPADM

## 2024-02-24 RX ORDER — HYDROMORPHONE HYDROCHLORIDE 1 MG/ML
0.25 INJECTION, SOLUTION INTRAMUSCULAR; INTRAVENOUS; SUBCUTANEOUS EVERY 4 HOURS PRN
Status: CANCELLED | OUTPATIENT
Start: 2024-02-24

## 2024-02-24 RX ORDER — IBUPROFEN 200 MG
16-32 TABLET ORAL AS NEEDED
Status: CANCELLED | OUTPATIENT
Start: 2024-02-24

## 2024-02-24 RX ORDER — DEXAMETHASONE SODIUM PHOSPHATE 4 MG/ML
INJECTION, SOLUTION INTRA-ARTICULAR; INTRALESIONAL; INTRAMUSCULAR; INTRAVENOUS; SOFT TISSUE AS NEEDED
Status: DISCONTINUED | OUTPATIENT
Start: 2024-02-24 | End: 2024-02-24 | Stop reason: SURG

## 2024-02-24 RX ORDER — HYDROMORPHONE HYDROCHLORIDE 1 MG/ML
0.5 INJECTION, SOLUTION INTRAMUSCULAR; INTRAVENOUS; SUBCUTANEOUS ONCE
Status: COMPLETED | OUTPATIENT
Start: 2024-02-24 | End: 2024-02-24

## 2024-02-24 RX ORDER — ONDANSETRON 4 MG/1
4 TABLET, ORALLY DISINTEGRATING ORAL EVERY 8 HOURS PRN
Status: CANCELLED | OUTPATIENT
Start: 2024-02-24

## 2024-02-24 RX ORDER — MIDAZOLAM HYDROCHLORIDE 2 MG/2ML
INJECTION, SOLUTION INTRAMUSCULAR; INTRAVENOUS AS NEEDED
Status: DISCONTINUED | OUTPATIENT
Start: 2024-02-24 | End: 2024-02-24 | Stop reason: SURG

## 2024-02-24 RX ORDER — ONDANSETRON HYDROCHLORIDE 2 MG/ML
4 INJECTION, SOLUTION INTRAVENOUS
Status: DISCONTINUED | OUTPATIENT
Start: 2024-02-24 | End: 2024-02-24 | Stop reason: HOSPADM

## 2024-02-24 RX ORDER — ONDANSETRON HYDROCHLORIDE 2 MG/ML
INJECTION, SOLUTION INTRAVENOUS AS NEEDED
Status: DISCONTINUED | OUTPATIENT
Start: 2024-02-24 | End: 2024-02-24 | Stop reason: SURG

## 2024-02-24 RX ORDER — PHENYLEPHRINE HYDROCHLORIDE 10 MG/ML
INJECTION INTRAVENOUS AS NEEDED
Status: DISCONTINUED | OUTPATIENT
Start: 2024-02-24 | End: 2024-02-24 | Stop reason: SURG

## 2024-02-24 RX ORDER — DEXTROSE 50 % IN WATER (D50W) INTRAVENOUS SYRINGE
25 AS NEEDED
Status: CANCELLED | OUTPATIENT
Start: 2024-02-24

## 2024-02-24 RX ADMIN — SODIUM CHLORIDE 500 ML: 9 INJECTION, SOLUTION INTRAVENOUS at 14:46

## 2024-02-24 RX ADMIN — LIDOCAINE HYDROCHLORIDE 10 ML: 10 INJECTION, SOLUTION INFILTRATION; PERINEURAL at 13:54

## 2024-02-24 RX ADMIN — PROPOFOL 200 MG: 10 INJECTION, EMULSION INTRAVENOUS at 13:54

## 2024-02-24 RX ADMIN — ONDANSETRON 4 MG: 2 INJECTION INTRAMUSCULAR; INTRAVENOUS at 13:56

## 2024-02-24 RX ADMIN — SODIUM CHLORIDE 500 ML: 9 INJECTION, SOLUTION INTRAVENOUS at 09:25

## 2024-02-24 RX ADMIN — METHYLERGONOVINE MALEATE 200 MCG: 0.2 INJECTION, SOLUTION INTRAMUSCULAR; INTRAVENOUS at 08:53

## 2024-02-24 RX ADMIN — MORPHINE SULFATE 2 MG: 2 INJECTION, SOLUTION INTRAMUSCULAR; INTRAVENOUS at 07:42

## 2024-02-24 RX ADMIN — SODIUM CHLORIDE 1000 ML: 9 INJECTION, SOLUTION INTRAVENOUS at 08:12

## 2024-02-24 RX ADMIN — SODIUM CHLORIDE 1000 ML: 9 INJECTION, SOLUTION INTRAVENOUS at 07:33

## 2024-02-24 RX ADMIN — MIDAZOLAM HYDROCHLORIDE 2 MG: 1 INJECTION, SOLUTION INTRAMUSCULAR; INTRAVENOUS at 13:50

## 2024-02-24 RX ADMIN — SODIUM CHLORIDE: 0.9 INJECTION, SOLUTION INTRAVENOUS at 13:50

## 2024-02-24 RX ADMIN — DEXAMETHASONE SODIUM PHOSPHATE 4 MG: 4 INJECTION, SOLUTION INTRA-ARTICULAR; INTRALESIONAL; INTRAMUSCULAR; INTRAVENOUS; SOFT TISSUE at 13:56

## 2024-02-24 RX ADMIN — PHENYLEPHRINE HYDROCHLORIDE 200 MCG: 10 INJECTION INTRAVENOUS at 13:55

## 2024-02-24 RX ADMIN — HYDROMORPHONE HYDROCHLORIDE 0.5 MG: 1 INJECTION, SOLUTION INTRAMUSCULAR; INTRAVENOUS; SUBCUTANEOUS at 12:28

## 2024-02-24 ASSESSMENT — ENCOUNTER SYMPTOMS
RESPIRATORY NEGATIVE: 1
SHORTNESS OF BREATH: 0
NAUSEA: 0
BRUISES/BLEEDS EASILY: 0
VOMITING: 0
PSYCHIATRIC NEGATIVE: 1
MUSCULOSKELETAL NEGATIVE: 1
LIGHT-HEADEDNESS: 1
PALPITATIONS: 0
WEAKNESS: 1
ABDOMINAL PAIN: 1
CARDIOVASCULAR NEGATIVE: 1
CONSTITUTIONAL NEGATIVE: 1

## 2024-02-24 NOTE — ANESTHESIA PREPROCEDURE EVALUATION
Anesthesia ROS/MED HX    Anesthesia History - neg  Pulmonary - neg  Neuro/Psych - neg  Cardiovascular- neg  Hematological    anemia  GI/Hepatic- neg  Musculoskeletal- neg  Renal Disease- neg  Endo/Other- neg  Body Habitus: Normal      Relevant Problems   HEMATOLOGY   (+) Anemia       Physical Exam    Airway   Mallampati: II   TM distance: <3 FB   Neck ROM: full  Cardiovascular - normal   Rhythm: regular   Rate: normalPulmonary - normal   clear to auscultation  Other Findings   Back - neg   landmarks identified    Dental - normal      Patient Active Problem List   Diagnosis   • Miscarriage   • Anemia   • Incomplete spontaneous  with delayed or excessive hemorrhage        History reviewed. No pertinent past medical history.    History reviewed. No pertinent surgical history.    No current facility-administered medications for this encounter.       Prior to Admission medications    Medication Sig Start Date End Date Taking? Authorizing Provider   prenatal vit no.130-iron-folic 27 mg iron- 800 mcg tablet tablet Take 1 tablet by mouth daily.    Provider, Maria Devlin MD   cholecalciferol, vitamin D3, (VITAMIN D3) 10 mcg (400 unit) capsule 125 mcg. 23  Provider, Maria Devlin MD       CBC Results       24     0904 0732 1206    WBC 11.92 12.50 14.30    RBC 2.59 3.58 3.79    HGB 8.2 10.9 11.5    HCT 24.2 32.8 34.8    MCV 93.4 91.6 91.8    MCH 31.7 30.4 30.3    MCHC 33.9 33.2 33.0     292 212         Comment for HGB at 0904 on 24: ALL RESULTS HAVE BEEN CHECKED          BMP Results       24     0732 1206 0736     137 136    K 3.9 5.9 3.7    Cl 107 110 103    CO2 22 20 25    Glucose 114 97 98    BUN 19 10 13    Creatinine 0.5 0.3 0.5    Calcium 9.0 6.9 9.4    Anion Gap 8 7 8    EGFR >60.0 >60.0 >60.0         Comment for K at 0732 on 24: Results obtained on plasma. Plasma Potassium values may be up to 0.4 mEQ/L less than  serum values. The differences may be greater for patients with high platelet or white cell counts.    Comment for K at 0736 on 23: Results obtained on plasma. Plasma Potassium values may be up to 0.4 mEQ/L less than serum values. The differences may be greater for patients with high platelet or white cell counts.    Comment for EGFR at 0732 on 24: Calculation based on the Chronic Kidney Disease Epidemiology Collaboration (CKD-EPI) equation refit without adjustment for race.          Lab Results   Component Value Date    HCGQUANT 5,582.0 (H) 2024             ULTRASOUND PREGNANCY TRANSVAGINAL ONLY   Final Result   IMPRESSION:   Findings consistent with completed .                    Anesthesia Plan    Plan: general    Technique: general LMA     Lines and Monitors: PIV   2 ASA - emergent  Blood Products:     Use of Blood Products Discussed: Yes     Consented to blood products  Anesthetic plan and risks discussed with: patient  Induction:    intravenous   Postop Plan:   Patient Disposition: phase II then home   Pain Management: IV analgesics  Comments:    Plan: Pt NPO since last pm- had few sips of water at 04:00.  Will proceed with LMA.  Patient received 1 unit PRBC in ER- has active T&S

## 2024-02-24 NOTE — ED PROVIDER NOTES
Emergency Medicine Note  HPI   HISTORY OF PRESENT ILLNESS     Patient is a 38-year-old female approximately 8 weeks pregnant status post spontaneous .  She was given Cytotec by OB/GYN last night and presents today with significant abdominal discomfort and heavy vaginal bleeding.  Patient describes it is much heavier than normal menses.  Patient denied significant abdominal pain but has had severe cramping.  Patient is lightheaded and dizzy but denied any chest pain or shortness of breath at this time            Patient History   PAST HISTORY     Reviewed from Nursing Triage:  Meds       History reviewed. No pertinent past medical history.    History reviewed. No pertinent surgical history.    History reviewed. No pertinent family history.    Social History     Tobacco Use   • Smoking status: Never   • Smokeless tobacco: Never   Substance Use Topics   • Alcohol use: Never   • Drug use: Never         Review of Systems   REVIEW OF SYSTEMS     Review of Systems   Constitutional: Negative.    HENT: Negative.    Respiratory: Negative.  Negative for shortness of breath.    Cardiovascular: Negative.  Negative for chest pain and palpitations.   Gastrointestinal: Positive for abdominal pain. Negative for nausea and vomiting.   Genitourinary: Positive for vaginal bleeding. Negative for vaginal discharge.   Musculoskeletal: Negative.    Skin: Positive for pallor.   Neurological: Positive for weakness and light-headedness.        Generalized without focal deficit   Hematological: Does not bruise/bleed easily.   Psychiatric/Behavioral: Negative.          VITALS     ED Vitals    Date/Time Temp Pulse Resp BP SpO2 [facility]   24 1154 -- 84 16 94/61 98 % AJM   24 1054 -- 88 17 108/61 99 % AJM   24 1026 36.7 °C (98.1 °F) 86 16 98/60 100 % AJM   24 1008 37.2 °C (98.9 °F) 85 17 94/55 100 % AJM   24 0954 -- 88 17 94/51 100 % AJM   24 0950 37.3 °C (99.1 °F) 90 16 85/54 100 % AJM   24 0926 -- 98  18 91/56 -- AJM   02/24/24 0922 -- 99 17 87/51 -- AJM   02/24/24 0917 -- 71 24 75/41 -- [facility]   02/24/24 0849 -- 86 20 93/59 97 % AJM   02/24/24 0827 -- 93 17 89/56 98 % AJM   02/24/24 0800 -- 84 16 96/59 98 % AJM   02/24/24 0744 -- 86 17 100/63 98 % AJM   02/24/24 0706 37.1 °C (98.8 °F) 108 18 97/56 98 % JLG        Pulse Ox %: 98 % (02/24/24 0727)  Pulse Ox Interpretation: Normal (02/24/24 0727)  Heart Rate: 108 (02/24/24 0727)  Rhythm Strip Interpretation: Sinus Tachycardia (02/24/24 0727)     Physical Exam   PHYSICAL EXAM     Physical Exam  Exam conducted with a chaperone present.   Constitutional:       Appearance: Normal appearance. She is ill-appearing.   HENT:      Head: Normocephalic and atraumatic.   Cardiovascular:      Rate and Rhythm: Regular rhythm. Tachycardia present.      Pulses: Normal pulses.   Pulmonary:      Effort: Pulmonary effort is normal. No respiratory distress.   Abdominal:      Tenderness: There is abdominal tenderness. There is no guarding or rebound.   Genitourinary:     Vagina: No vaginal discharge.      Comments:  there was some dried blood and large clots in the introitus.  I did not appreciate any active bleeding at the time of my exam.  Patient had tenderness in the suprapubic region as well as diffusely throughout the abdomen  Musculoskeletal:         General: Normal range of motion.   Skin:     Coloration: Skin is pale.   Neurological:      General: No focal deficit present.      Mental Status: She is alert and oriented to person, place, and time.   Psychiatric:         Mood and Affect: Mood normal.         Behavior: Behavior normal.           PROCEDURES     Procedures     DATA     Results     Procedure Component Value Units Date/Time    Prepare RBC-(BLOOD BANK ORDER for PRODUCT): 1 Units Transfusion indications: Acute Bleeding [[id number]] Collected: 02/24/24 0928     Updated: 02/24/24 0941     Product Code Z8058F22     Unit ID [id number]     Unit ABO A     Unit RH Positive      Crossmatch Compatible     Product Status IS     Unit Expiration Date/Time 717547775228     ISBT Code 6200    CBC [[id number]]  (Abnormal) Collected: 02/24/24 0904    Specimen: Blood, Venous Updated: 02/24/24 0925     WBC 11.92 K/uL      RBC 2.59 M/uL      Hemoglobin 8.2 g/dL      Comment: ALL RESULTS HAVE BEEN CHECKED        Hematocrit 24.2 %      MCV 93.4 fL      MCH 31.7 pg      MCHC 33.9 g/dL      RDW 12.1 %      Platelets 223 K/uL      MPV 10.2 fL     Type and screen [[id number]] Collected: 02/24/24 0732    Specimen: Blood, Venous Updated: 02/24/24 0902     Specimen Expiration 02/27/2024     Antibody Screen Negative     ABO A     Rh Factor Positive     History Check Previous type on file    BhCG, Serum, Quant [[id number]]  (Abnormal) Collected: 02/24/24 0732    Specimen: Blood, Venous Updated: 02/24/24 0849     hCG Quant 5,582.0 IU/L (mIU/mL)      Comment: Checked by dilution  Approx. Gestational Age   Approx. hCG Range         (Weeks)                  (IU/L)          0.2-1                    5-50            1-2                               2-3                  100-5000            3-4                  500-10,000            4-5                 1000-50,000            5-6               10,000-100,000            6-8               15,000-200,000            8-12              10,000-100,000       Comprehensive metabolic panel [[id number]]  (Abnormal) Collected: 02/24/24 0732    Specimen: Blood, Venous Updated: 02/24/24 0825     Sodium 137 mEQ/L      Potassium 3.9 mEQ/L      Comment: Results obtained on plasma. Plasma Potassium values may be up to 0.4 mEQ/L less than serum values. The differences may be greater for patients with high platelet or white cell counts.        Chloride 107 mEQ/L      CO2 22 mEQ/L      BUN 19 mg/dL      Creatinine 0.5 mg/dL      Glucose 114 mg/dL      Calcium 9.0 mg/dL      AST (SGOT) 11 IU/L      ALT (SGPT) 11 IU/L      Alkaline Phosphatase 35 IU/L      Total Protein 7.0 g/dL       Comment: Test performed on plasma which typically contains approximately 0.4 g/dL more protein than serum.        Albumin 4.2 g/dL      Bilirubin, Total 0.4 mg/dL      eGFR >60.0 mL/min/1.73m*2      Comment: Calculation based on the Chronic Kidney Disease Epidemiology Collaboration (CKD-EPI) equation refit without adjustment for race.        Anion Gap 8 mEQ/L     CBC and differential [[id number]]  (Abnormal) Collected: 02/24/24 0732    Specimen: Blood, Venous Updated: 02/24/24 0754     WBC 12.50 K/uL      RBC 3.58 M/uL      Hemoglobin 10.9 g/dL      Hematocrit 32.8 %      MCV 91.6 fL      MCH 30.4 pg      MCHC 33.2 g/dL      RDW 11.9 %      Platelets 292 K/uL      MPV 10.5 fL      Differential Type Auto     nRBC 0.0 %      Immature Granulocytes 0.4 %      Neutrophils 81.6 %      Lymphocytes 14.8 %      Monocytes 2.8 %      Eosinophils 0.2 %      Basophils 0.2 %      Immature Granulocytes, Absolute 0.05 K/uL      Neutrophils, Absolute 10.21 K/uL      Lymphocytes, Absolute 1.85 K/uL      Monocytes, Absolute 0.35 K/uL      Eosinophils, Absolute 0.02 K/uL      Basophils, Absolute 0.02 K/uL     RAINBOW LT BLUE [[id number]] Collected: 02/24/24 0732    Specimen: Blood, Venous Updated: 02/24/24 0746    [place] Draw Panel [[id number]] Collected: 02/24/24 0732    Specimen: Blood, Venous Updated: 02/24/24 0746    Narrative:      The following orders were created for panel order [place] Draw Panel.  Procedure                               Abnormality         Status                     ---------                               -----------         ------                     RAINBOW LT BLUE[[id number]]                                  In process                 RAINBOW GOLD[[id number]]                                     In process                   Please view results for these tests on the individual orders.    RAINBOW GOLD [[id number]] Collected: 02/24/24 0732    Specimen: Blood, Venous Updated: 02/24/24 0746          Imaging Results           ULTRASOUND PREGNANCY TRANSVAGINAL ONLY (Final result)  Result time 24 10:14:52    Final result                 Impression:    IMPRESSION:  Findings consistent with completed .                     Narrative:      CLINICAL HISTORY:  Positive pregnancy.  Vaginal bleeding.    Comparison: Pelvic ultrasound 2024.    COMMENT:  Technique: A complete transabdominal pelvis and transvaginal pelvis examination  was performed.  Transvaginal scan were performed for better delineation of the  pelvic structures, particularly the endometrium and adnexa. Trans-abdominal  ultrasound was performed to allow a broad examination of the pelvis to visualize  structures that cannot be seen with transvaginal imaging alone.    FINDINGS:    Uterus: The uterus measures approximately 9 x 5 x 5 cm.  Interval loss of the of intrauterine gestational sac described on prior  ultrasound.  Endometrium somewhat heterogeneous, measures up to approximately 1.4 cm in  thickness.      Right ovary: The right ovary measures 1.4 cm  x 1.8 cm x 2.1 cm cm.    Left ovary: The left ovary measures 2.2 cm x 3.2 cm x 3.5 cm  cm.    No evidence of an adnexal mass.      Peritoneum: There is no free fluid in the pelvis.                                  No orders to display       Scoring tools                                  ED Course & MDM   MDM / ED COURSE / CLINICAL IMPRESSION / DISPO     MDM    ED Course as of 24 1238   Sat 2024   0728 Patient is a 38-year-old female status post miscarriage.  Patient was seen by OB yesterday and started on medications to evacuate the pregnancy.  Early this morning she developed severe pelvic pain and heavy bleeding.  Will treat with IV fluids and pain medications.  Will check labs including type and screen in the event the patient needs a blood transfusion [LUIZ]   0755 Hemoglobin(!): 10.9  Mild anemia but stable and consistent with previous labs [LUIZ]   0809 Patient continues to be borderline  hypotensive in spite of a liter of fluid.  Will start second liter [LUIZ]   0821 Call placed to OB [LUIZ]   0840 Case discussed with Dr. Joya who recommends 0.2 mg of Methergine and repeat ultrasound.  He will evaluate after the ultrasound is done [LUIZ]   0902 Patient continues to bleed work repeat hemoglobin [LUIZ]   0926 Hemoglobin(!): 8.2  Patient dropped to 8.2 will transfuse 1 unit of packed red cells [LUIZ]   0932 Will be aware of dramatic change in hemoglobin will be down to admit for OR [LUIZ]   1238 Critical care time 90 minutes provided by me [LUIZ]      ED Course User Index  [LUIZ] Sunny Castaneda DO     Clinical Impression      Vagina bleeding     _________________     ED Disposition   Send to OR / Endoscopy                   Sunny Castaneda DO  02/24/24 9822

## 2024-02-24 NOTE — OP NOTE
Procedure:    D&E     Indications: The patient has a history of missed ab treated with cytotec and now with heavy bleeding and a concern for retained products. The patient now presents for surgery after discussing therapeutic alternatives.          Pre-op Diagnosis     * Retained products of conception following  [O03.4]     Post-op Diagnosis     * Retained products of conception following  [O03.4]     Surgeon: Jose Joya DO      Anesthesia: General LMA anesthesia     ASA Class: 3     Estimated Blood Loss:  50 mL              Total IV Fluids: 400 ml           Complications:  None           Condition: stable     Findings: 10 week size anteverted uterus with retained tissue in suction tubing     Procedure Details   The patient presented to the hospital and denied any significant interval history. The surgeon and patient were mutually identified and the patient was taken to the operating room.  She was given LMA anesthesia. The patient was placed in the dorsal lithotomy position and prepped and draped in the usual sterile fashion.  A timeout was performed.       Next, a bimanual examination under anesthesia was performed.  The uterus was found to be approximately 10 weeks in gestational size.  Normal appearing external genitalia were appreciated.      A Arnett speculum and a [place] retractor were placed in the vagina, the cervix was easily visualized and the anterior lip was grasped with a single toothed tenaculum. The cervix was then dilated using Hodges dilators to a 25. A #10 curved suction curette was placed into the uterus and advanced to the uterine fundus. Suction tubing was then connected.  Suction evacuation was then preformed in a 360 degree fashion. Moderate tissue was evacuated. Following this, brief gentle sharp curettage was performed and then a final pass with the suction curette revealed no evidence of any further products of conception.       The single-tooth tenaculum was  removed from the cervix. Bleeding from the OS was noted to be stable at the os. All instruments were then removed from the patient's vagina and all counts were correct.  The patient tolerated the procedure very well.  She was awake in the operating room prior to being transferred to the recovery room, in stable condition.                Specimens:   ID Type Source Tests Collected by Time Destination   1 : PRODUCTS OF CONCEPTION RETAINED MATERIAL Products of Conception Products of Conception PATHOLOGY TISSUE EXAM Jose Joya DO 2/24/2024 1406      Jose Jyoa DO FACOOG (dist.)  [id number]  Cell  [phone]  2/24/2024  2:36 PM

## 2024-02-24 NOTE — DISCHARGE INSTRUCTIONS
??????? ??????    ???? ?? ??? ???????: ?????? ??????? ??? ?? ????? ??.   ?????????: ?????? ?????? ??? ?? ????? ??.   ???? ?????? Slo- FE (?? ?? ???????) ?? ???????? ?????.  ???? ????? 1 ??? ?? ??? ???? ?? ?????   ?? ??? ?? ?????? ??? ????? ??????? - ?? ??? ?? ?????? ?????   ????????? ??? × 2 ?????? ?? ?? ???? ??????  ?????? ??????? ??????? ?????                 ??????? ????    ???? ????? ?????? ?? ???:   ???? ????? > 1 ???? / ????   ???? ??????? > 101.1   ??? / ???? ????? ?? ????? ?????

## 2024-02-24 NOTE — ANESTHESIOLOGIST PRE-PROCEDURE ATTESTATION
Pre-Procedure Patient Identification:  I am the Primary Anesthesiologist and have identified the patient on 02/24/24 at 1:24 PM.   I have confirmed the procedure(s) will be performed by the following surgeon/proceduralist Jose Joya DO.

## 2024-02-24 NOTE — CONSULTS
Obstetrics Consult Note    Subjective     Karlos Orosco is a 38 y.o. female  who presented to the ED with heavy vaginal bleeding s/p misoprostol. Patient was seen in consultation at the request of referring physician for management recommendations. Patient with a known missed ab after ultrasound at [place].  Went to office at her primary OB/Gyn and pt given cytotec.  Cytotec caused pt to be very crampy and she started to bleed heavily last pm.  Pt admits to passing tissue last night/this am as well.  When pt came to ED she reported that her bleeding is decreasing. When I was called by the ED doc - pt was hypotensive and tachycardic.  Asked them to give 0.2mg of methergine IM and to get an U/S stat.  ED doc gave pt a unit of blood.      Unable to review outside records..    OB History:   OB History    Para Term  AB Living   2 0 0 0 0 0   SAB IAB Ectopic Multiple Live Births   0 0 0 0 0      # Outcome Date GA Lbr Matt/2nd Weight Sex Delivery Anes PTL Lv   2 Current            1                 Medical History: History reviewed. No pertinent past medical history.    Surgical History: History reviewed. No pertinent surgical history.    Social History:   Social History     Socioeconomic History   • Marital status:      Spouse name: None   • Number of children: None   • Years of education: None   • Highest education level: None   Tobacco Use   • Smoking status: Never   • Smokeless tobacco: Never   Substance and Sexual Activity   • Alcohol use: Never   • Drug use: Never   • Sexual activity: Yes     Partners: Male     Birth control/protection: None     Social Determinants of Health     Food Insecurity: No Food Insecurity (2024)    Hunger Vital Sign    • Worried About Running Out of Food in the Last Year: Never true    • Ran Out of Food in the Last Year: Never true       Family History: History reviewed. No pertinent family history.    Allergies: Patient has no known allergies.    No  current facility-administered medications for this encounter.    Current Outpatient Medications:   •  prenatal vit no.130-iron-folic 27 mg iron- 800 mcg tablet tablet, Take 1 tablet by mouth daily., Disp: , Rfl:     Review of Systems:  Pertinent items are noted in HPI.    Objective   Labs:  I have reviewed the patient's labs.  Significant abnormals are hemoglobin of 8.    Imaging/Ultrasounds:   I have reviewed the applicable Ultrasounds. and Significant findings include: sac that was present 48 hours ago is no longer present.  EMS 14mm      Exam:  General appearance: alert, appears stated age and cooperative  Abdomen: soft, non-tender; bowel sounds normal; no masses, no organomegaly  Female genitalia: Spec exam done - no heavy bleeding coming from cervix, clot easily removed.  No tissue noted  Extremities: extremities normal, warm and well-perfused; no cyanosis, clubbing, or edema      Assessment   Karlos Orosco is a 38 y.o. female  at Unknown being consulted for heavy bleeding s/p cytotec for treatment of a missed ab       Plan     Pt currently not bleeding.  Will watch for 1-2 hours and reassess.  If bleeding stable - recommend oral methergine and repeat cytotec tonignt to pass any remaining clots.  If continues to bleed - will need suction RELL sandhu E     DO QUINCY EucedaG (dist.)  [id number]  Cell  [phone]  2024  11:32 AM

## 2024-02-24 NOTE — PROGRESS NOTES
Gyn ON Call    Gynecology History and Physical    HPI     Patient is a 38 y.o. female who presented to the ED with heavy vaginal bleeding after taking Miso.  Pt given Methergine and observed after U/S reported that the sac was passed.  However pt is stil bleding and OS open 1-2cm.  Will admit for surgery - suction D and E     OB History:   OB History    Para Term  AB Living   2 0 0 0 0 0   SAB IAB Ectopic Multiple Live Births   0 0 0 0 0      # Outcome Date GA Lbr Matt/2nd Weight Sex Delivery Anes PTL Lv   2 Current            1                 Medical History: History reviewed. No pertinent past medical history.    Surgical History: History reviewed. No pertinent surgical history.    Social History:   Social History     Social History Narrative   • Not on file       Family History: History reviewed. No pertinent family history.    Allergies: Patient has no known allergies.    Prior to Admission medications    Medication Sig Start Date End Date Taking? Authorizing Provider   prenatal vit no.130-iron-folic 27 mg iron- 800 mcg tablet tablet Take 1 tablet by mouth daily.    Provider, Maria Devlin MD   cholecalciferol, vitamin D3, (VITAMIN D3) 10 mcg (400 unit) capsule 125 mcg. 23  Provider, Maria Devlin MD       Review of Systems  All other systems reviewed and negative except as noted in the HPI.    Objective     Vital Signs for the last 24 hours:  Temp:  [36.3 °C (97.4 °F)-37.3 °C (99.1 °F)] 36.3 °C (97.4 °F)  Heart Rate:  [] 73  Resp:  [15-24] 17  BP: ()/(41-63) 91/51    Exam  General Appearance: Alert, cooperative, no acute distress  Head: Normocephalic, without obvious abnormality, atraumatic  Throat: Lips, mucosa, and tongue appear normal  Neck: no adenopathy  Thyroid: no enlargement/tenderness/nodules  Lungs: Clear to auscultation bilaterally, respirations unlabored  Heart: Regular rate and rhythm, S1 and S2 normal, no murmur, rub or gallop  Breast:  Deferred  Abdomen: Soft, nontender, nondistended, bowel sounds active all four quadrants,  no masses, no organomegaly  Back: no CVA tenderness  Genitalia: no lesions or masses, no uterine, cervical, or adnexal tenderness, Os open with clots noted  Rectal: Deferred  Extremities: no edema or calf tenderness  Musculoskeletal: No injury or deformity  Skin: Skin color, texture, turgor normal, no rashes or lesions  Lymph nodes: inguinal and axillary nodes normal  Neurologic: Deferred    Labs  I have reviewed the patient's labs.  Significant abnormals are anemia as described in ED consult.    Imaging  I have reviewed the Imaging from the last 24 hrs.        Assessment/Plan   1. Retained POCs after treatment with Misoprostol and heavy bleeding    Will proceed with suction D and E     Consent signed. OR aware.  Orders placed.    DO YESSENIA Euceda (dist.)  [id number]  Cell  [phone]  2/24/2024  12:42 PM

## 2024-02-24 NOTE — ANESTHESIA POSTPROCEDURE EVALUATION
Patient: Karlos Kwesi    Procedure Summary     Date: 24 Room / Location: [facility] OR  [facility] OR [facility]    Anesthesia Start: 1350 Anesthesia Stop: 1420    Procedure: D&E (Vagina ) Diagnosis:       Incomplete spontaneous  with delayed or excessive hemorrhage      (Incomplete spontaneous  with delayed or excessive hemorrhage [O03.1])    Surgeons: Jose Joya DO Responsible Provider: Carlene Trinidad DO    Anesthesia Type: general ASA Status: 2 - Emergent          Anesthesia Type: general  PACU Vitals  2024 1413 - 2024 1441      2024  1417 2024  1430          BP: 91/55 91/51      Temp: 36.3 °C (97.4 °F) --      Pulse: 81 73      Resp: 17 17      SpO2: 100 % 100 %              Anesthesia Post Evaluation    Pain management: adequate  Patient participation: complete - patient participated  Level of consciousness: awake and alert  Cardiovascular status: acceptable  Airway Patency: adequate  Respiratory status: acceptable  Hydration status: acceptable  Anesthetic complications: no       full note to follow

## 2024-02-24 NOTE — ANESTHESIA PROCEDURE NOTES
Airway  Start Time: 2/24/2024 1:54 PM  Airway not difficult    General Information and Staff    Patient location during procedure: OR  Anesthesiologist: Carlene Trinidad DO  Resident/CRNA: Dianne Pa CRNA  Performed: resident/CRNA   Performed by: Dianne Pa CRNA  Authorized by: Carlene Trinidad DO      Indications and Patient Condition  Indications for airway management: anesthesia  Sedation level: general  Preoxygenated: yes  Patient position: sniffing  MILS maintained throughout  Mask difficulty assessment: 1 - vent by mask    Final Airway Details  Final airway type: supraglottic airway      Successful airway: classic  Size 4     Number of attempts at approach: 1  Number of other approaches attempted: 0  Atraumatic airway insertion

## 2024-02-26 LAB
CROSSMATCH: NORMAL
ISBT CODE: 6200
PRODUCT CODE: NORMAL
PRODUCT STATUS: NORMAL
SPECIMEN EXP DATE BLD: NORMAL
UNIT ABO: NORMAL
UNIT ID: NORMAL
UNIT RH: POSITIVE

## 2024-02-27 LAB
CASE RPRT: NORMAL
CLINICAL INFO: NORMAL
PATH REPORT.FINAL DX SPEC: NORMAL
PATH REPORT.GROSS SPEC: NORMAL

## 2024-06-28 ENCOUNTER — TRANSCRIBE ORDERS (OUTPATIENT)
Dept: SCHEDULING | Age: 39
End: 2024-06-28

## 2024-06-28 DIAGNOSIS — Z32.01 ENCOUNTER FOR PREGNANCY TEST, RESULT POSITIVE: Primary | ICD-10-CM

## 2024-07-01 ENCOUNTER — HOSPITAL ENCOUNTER (OUTPATIENT)
Dept: RADIOLOGY | Facility: HOSPITAL | Age: 39
Discharge: HOME | End: 2024-07-01
Attending: OBSTETRICS & GYNECOLOGY
Payer: COMMERCIAL

## 2024-07-01 DIAGNOSIS — Z32.01 ENCOUNTER FOR PREGNANCY TEST, RESULT POSITIVE: ICD-10-CM

## 2024-07-01 PROCEDURE — 76817 TRANSVAGINAL US OBSTETRIC: CPT

## 2024-07-13 ENCOUNTER — HOSPITAL ENCOUNTER (EMERGENCY)
Facility: HOSPITAL | Age: 39
Discharge: HOME | End: 2024-07-13
Attending: EMERGENCY MEDICINE | Admitting: EMERGENCY MEDICINE
Payer: COMMERCIAL

## 2024-07-13 VITALS
RESPIRATION RATE: 16 BRPM | HEART RATE: 88 BPM | WEIGHT: 146 LBS | HEIGHT: 67 IN | DIASTOLIC BLOOD PRESSURE: 63 MMHG | SYSTOLIC BLOOD PRESSURE: 105 MMHG | TEMPERATURE: 97.3 F | BODY MASS INDEX: 22.91 KG/M2 | OXYGEN SATURATION: 99 %

## 2024-07-13 DIAGNOSIS — R21 RASH: Primary | ICD-10-CM

## 2024-07-13 PROCEDURE — 99282 EMERGENCY DEPT VISIT SF MDM: CPT | Mod: U5

## 2024-07-13 ASSESSMENT — ENCOUNTER SYMPTOMS
WEAKNESS: 0
NUMBNESS: 0
FEVER: 0

## 2024-07-13 NOTE — ED PROVIDER NOTES
Emergency Medicine Note  HPI   HISTORY OF PRESENT ILLNESS     Karlos Orosco is a 38 y.o. female presenting to the emergency department for evaluation of a rash.  Patient reports over the last few days noticing a rash to the dorsum of her right hand and also noted a lesion to her left hand and left upper arm.  She reports her son was just diagnosed with poison ivy after being outside.  She reports that she believes she got it from him.  She reports using calamine lotion today for the first time.  She does report the rash being itchy.  She denies any fevers.  Patient is 2 months pregnant.          Patient History   PAST HISTORY     Reviewed from Nursing Triage:       History reviewed. No pertinent past medical history.    History reviewed. No pertinent surgical history.    History reviewed. No pertinent family history.    Social History     Tobacco Use    Smoking status: Never    Smokeless tobacco: Never   Substance Use Topics    Alcohol use: Never    Drug use: Never         Review of Systems   REVIEW OF SYSTEMS     Review of Systems   Constitutional:  Negative for fever.   Skin:  Positive for rash.   Neurological:  Negative for weakness and numbness.         VITALS     ED Vitals      Date/Time Temp Pulse Resp BP [facility]   07/13/24 1639 -- -- -- 105/63 -- EKG   07/13/24 1636 36.3 °C (97.3 °F) 88 16 -- 99 % EKG          Pulse Ox %: 99 % (07/13/24 1801)  Pulse Ox Interpretation: Normal (07/13/24 1801)           Physical Exam   PHYSICAL EXAM     Physical Exam  Constitutional:       General: She is not in acute distress.  HENT:      Head: Normocephalic and atraumatic.   Cardiovascular:      Rate and Rhythm: Normal rate.      Pulses: Normal pulses.   Pulmonary:      Effort: Pulmonary effort is normal.   Musculoskeletal:      Cervical back: Neck supple.   Skin:     General: Skin is warm.      Findings: Rash present.      Comments: Erythematous rash with blisters with 4 lesions to right dorsum of hand 1 lesion to left  dorsum of hand and 1 lesion to left upper arm consistent with contact dermatitis or poison ivy.    Neurological:      Mental Status: She is alert and oriented to person, place, and time.   Psychiatric:         Mood and Affect: Mood normal.         Behavior: Behavior normal.           PROCEDURES     Procedures     DATA     Results       None            Imaging Results    None         No orders to display       Scoring tools                                  ED Course & MDM   MDM / ED COURSE / CLINICAL IMPRESSION / DISPO     Medical Decision Making  Most likely contact dermatitis from poison ivy.  Will treat with over-the-counter hydrocortisone 1% cream as patient is pregnant with small area of rash would like to avoid systemic steroids    Problems Addressed:  Rash: acute illness or injury        ED Course as of 07/13/24 2121   Sat Jul 13, 2024   1801 DW pt and , will treat with cortisone cream [WS]      ED Course User Index  [WS] Denilson Saenz MD     Clinical Impression      Rash     _________________       ED Disposition   Discharge                       Hanna Amaro PA C  07/13/24 2121

## 2024-07-13 NOTE — ED ATTESTATION NOTE
I have personally seen and examined the patient.  I reviewed and agree with physician assistant / nurse practitioner’s assessment and plan of care, with the following exceptions: None    I personally performed the key components of the encounter and provided a substantive portion of the care and medical decision making    My examination, assessment, and plan of care of Karlos Orosco is as follows:     PT with itchy rash to right hand/wrist.  Son has poison ivy  Exam: Right wrist: 4 patches of erythema/blisters.  Left arm has 2 small areas of blistering.  CW contact dermatitis      SterDenilson barrett MD  07/13/24 3217

## 2024-07-13 NOTE — DISCHARGE INSTRUCTIONS
Please follow-up with your primary care provider for next available appointment. You can use hydrocortisone 1% cream over-the-counter as directed on the packaging.  Return to the emergency department for new or worsening symptoms.

## 2024-07-25 LAB
EXTERNAL ANTIBODY SCREEN: NORMAL
HBV SURFACE AG SER QL: NONREACTIVE
HCV AB SER QL: NONREACTIVE
HIV 1+2 AB+HIV1 P24 AG SERPL QL IA: NONREACTIVE
QST CHLAMYDIA TRACHOMATIS RNA, TMA: NEGATIVE
QST NEISSERIA GONORRHOEAE RNA, TMA: NEGATIVE
RUBELLA IGG SCREEN: NORMAL

## 2024-07-29 ENCOUNTER — TRANSCRIBE ORDERS (OUTPATIENT)
Dept: SCHEDULING | Age: 39
End: 2024-07-29

## 2024-07-29 DIAGNOSIS — Z36.89 ENCOUNTER FOR OTHER SPECIFIED ANTENATAL SCREENING: ICD-10-CM

## 2024-07-29 DIAGNOSIS — O30.041 TWIN PREGNANCY, DICHORIONIC/DIAMNIOTIC, FIRST TRIMESTER: Primary | ICD-10-CM

## 2024-08-19 ENCOUNTER — HOSPITAL ENCOUNTER (OUTPATIENT)
Dept: PERINATAL CARE | Facility: HOSPITAL | Age: 39
Discharge: HOME | End: 2024-08-19
Attending: NURSE PRACTITIONER
Payer: COMMERCIAL

## 2024-08-19 ENCOUNTER — TRANSCRIBE ORDERS (OUTPATIENT)
Dept: REGISTRATION | Facility: HOSPITAL | Age: 39
End: 2024-08-19

## 2024-08-19 DIAGNOSIS — Z36.89 ENCOUNTER FOR OTHER SPECIFIED ANTENATAL SCREENING: ICD-10-CM

## 2024-08-19 DIAGNOSIS — O30.041 TWIN PREGNANCY, DICHORIONIC/DIAMNIOTIC, FIRST TRIMESTER: Primary | ICD-10-CM

## 2024-08-19 DIAGNOSIS — Z3A.13 13 WEEKS GESTATION OF PREGNANCY: ICD-10-CM

## 2024-08-19 DIAGNOSIS — Z36.3 ANTENATAL SCREENING FOR MALFORMATION USING ULTRASONICS: ICD-10-CM

## 2024-08-19 DIAGNOSIS — Z36.82 ENCOUNTER FOR NUCHAL TRANSLUCENCY TESTING: ICD-10-CM

## 2024-08-19 PROCEDURE — 76802 OB US < 14 WKS ADDL FETUS: CPT

## 2024-08-19 PROCEDURE — 76813 OB US NUCHAL MEAS 1 GEST: CPT

## 2024-08-20 ENCOUNTER — TRANSCRIBE ORDERS (OUTPATIENT)
Dept: SCHEDULING | Age: 39
End: 2024-08-20

## 2024-08-20 DIAGNOSIS — O30.042 TWIN PREGNANCY, DICHORIONIC/DIAMNIOTIC, SECOND TRIMESTER: Primary | ICD-10-CM

## 2024-09-12 ENCOUNTER — TRANSCRIBE ORDERS (OUTPATIENT)
Dept: SCHEDULING | Age: 39
End: 2024-09-12

## 2024-09-12 ENCOUNTER — HOSPITAL ENCOUNTER (OUTPATIENT)
Dept: PERINATAL CARE | Facility: HOSPITAL | Age: 39
Discharge: HOME | End: 2024-09-12
Attending: STUDENT IN AN ORGANIZED HEALTH CARE EDUCATION/TRAINING PROGRAM
Payer: COMMERCIAL

## 2024-09-12 DIAGNOSIS — O30.042 TWIN PREGNANCY, DICHORIONIC/DIAMNIOTIC, SECOND TRIMESTER: ICD-10-CM

## 2024-09-12 DIAGNOSIS — O09.522 SUPERVISION OF ELDERLY MULTIGRAVIDA, SECOND TRIMESTER: ICD-10-CM

## 2024-09-12 DIAGNOSIS — O30.042 TWIN PREGNANCY, DICHORIONIC/DIAMNIOTIC, SECOND TRIMESTER: Primary | ICD-10-CM

## 2024-09-12 DIAGNOSIS — Z36.3 ENCOUNTER FOR ANTENATAL SCREENING FOR MALFORMATION: Primary | ICD-10-CM

## 2024-09-12 DIAGNOSIS — Z3A.17 17 WEEKS GESTATION OF PREGNANCY: ICD-10-CM

## 2024-09-12 PROCEDURE — 76810 OB US >/= 14 WKS ADDL FETUS: CPT

## 2024-10-14 ENCOUNTER — HOSPITAL ENCOUNTER (OUTPATIENT)
Dept: PERINATAL CARE | Facility: HOSPITAL | Age: 39
Discharge: HOME | End: 2024-10-14
Attending: STUDENT IN AN ORGANIZED HEALTH CARE EDUCATION/TRAINING PROGRAM
Payer: COMMERCIAL

## 2024-10-14 DIAGNOSIS — Z36.86 ENCOUNTER FOR SCREENING FOR RISK OF PRE-TERM LABOR: ICD-10-CM

## 2024-10-14 DIAGNOSIS — Z36.3 SCREENING, ANTENATAL, FOR MALFORMATION BY ULTRASOUND: ICD-10-CM

## 2024-10-14 DIAGNOSIS — O09.522 SUPERVISION OF ELDERLY MULTIGRAVIDA, SECOND TRIMESTER: ICD-10-CM

## 2024-10-14 DIAGNOSIS — Z3A.21 21 WEEKS GESTATION OF PREGNANCY: ICD-10-CM

## 2024-10-14 DIAGNOSIS — O30.042 TWIN PREGNANCY, DICHORIONIC/DIAMNIOTIC, SECOND TRIMESTER: Primary | ICD-10-CM

## 2024-10-14 PROCEDURE — 76811 OB US DETAILED SNGL FETUS: CPT

## 2024-11-04 ENCOUNTER — HOSPITAL ENCOUNTER (OUTPATIENT)
Dept: PERINATAL CARE | Facility: HOSPITAL | Age: 39
Discharge: HOME | End: 2024-11-04
Attending: STUDENT IN AN ORGANIZED HEALTH CARE EDUCATION/TRAINING PROGRAM
Payer: COMMERCIAL

## 2024-11-04 DIAGNOSIS — O30.042 TWIN PREGNANCY, DICHORIONIC/DIAMNIOTIC, SECOND TRIMESTER: ICD-10-CM

## 2024-11-04 DIAGNOSIS — Z3A.24 24 WEEKS GESTATION OF PREGNANCY: ICD-10-CM

## 2024-11-04 DIAGNOSIS — Z36.86 ENCOUNTER FOR SCREENING FOR RISK OF PRE-TERM LABOR: ICD-10-CM

## 2024-11-04 DIAGNOSIS — O09.522 AMA (ADVANCED MATERNAL AGE) MULTIGRAVIDA 35+, SECOND TRIMESTER: Primary | ICD-10-CM

## 2024-11-04 PROCEDURE — 76817 TRANSVAGINAL US OBSTETRIC: CPT

## 2024-12-02 ENCOUNTER — HOSPITAL ENCOUNTER (OUTPATIENT)
Dept: PERINATAL CARE | Facility: HOSPITAL | Age: 39
Discharge: HOME | End: 2024-12-02
Attending: STUDENT IN AN ORGANIZED HEALTH CARE EDUCATION/TRAINING PROGRAM
Payer: COMMERCIAL

## 2024-12-02 ENCOUNTER — TRANSCRIBE ORDERS (OUTPATIENT)
Dept: REGISTRATION | Facility: HOSPITAL | Age: 39
End: 2024-12-02

## 2024-12-02 DIAGNOSIS — O09.523 SUPERVISION OF ELDERLY MULTIGRAVIDA IN THIRD TRIMESTER: ICD-10-CM

## 2024-12-02 DIAGNOSIS — O30.042 TWIN PREGNANCY, DICHORIONIC/DIAMNIOTIC, SECOND TRIMESTER: Primary | ICD-10-CM

## 2024-12-02 DIAGNOSIS — O30.043 DICHORIONIC DIAMNIOTIC TWIN PREGNANCY IN THIRD TRIMESTER: Primary | ICD-10-CM

## 2024-12-02 DIAGNOSIS — Z3A.28 28 WEEKS GESTATION OF PREGNANCY: ICD-10-CM

## 2024-12-02 PROCEDURE — 76816 OB US FOLLOW-UP PER FETUS: CPT

## 2024-12-26 ENCOUNTER — HOSPITAL ENCOUNTER (OUTPATIENT)
Dept: PERINATAL CARE | Facility: HOSPITAL | Age: 39
Discharge: HOME | End: 2024-12-26
Attending: STUDENT IN AN ORGANIZED HEALTH CARE EDUCATION/TRAINING PROGRAM
Payer: COMMERCIAL

## 2024-12-26 VITALS — DIASTOLIC BLOOD PRESSURE: 55 MMHG | SYSTOLIC BLOOD PRESSURE: 98 MMHG

## 2024-12-26 DIAGNOSIS — Z3A.32 32 WEEKS GESTATION OF PREGNANCY: ICD-10-CM

## 2024-12-26 DIAGNOSIS — O30.043 DICHORIONIC DIAMNIOTIC TWIN PREGNANCY IN THIRD TRIMESTER: ICD-10-CM

## 2024-12-26 DIAGNOSIS — O09.523 AMA (ADVANCED MATERNAL AGE) MULTIGRAVIDA 35+, THIRD TRIMESTER: Primary | ICD-10-CM

## 2024-12-26 PROCEDURE — 76816 OB US FOLLOW-UP PER FETUS: CPT

## 2024-12-30 ENCOUNTER — HOSPITAL ENCOUNTER (OUTPATIENT)
Dept: PERINATAL CARE | Facility: HOSPITAL | Age: 39
Discharge: HOME | End: 2024-12-30
Attending: STUDENT IN AN ORGANIZED HEALTH CARE EDUCATION/TRAINING PROGRAM
Payer: COMMERCIAL

## 2024-12-30 VITALS — DIASTOLIC BLOOD PRESSURE: 52 MMHG | SYSTOLIC BLOOD PRESSURE: 98 MMHG

## 2024-12-30 DIAGNOSIS — O09.523 AMA (ADVANCED MATERNAL AGE) MULTIGRAVIDA 35+, THIRD TRIMESTER: Primary | ICD-10-CM

## 2024-12-30 DIAGNOSIS — O30.043 DICHORIONIC DIAMNIOTIC TWIN PREGNANCY IN THIRD TRIMESTER: ICD-10-CM

## 2024-12-30 DIAGNOSIS — Z3A.32 32 WEEKS GESTATION OF PREGNANCY: ICD-10-CM

## 2024-12-30 PROCEDURE — 59025 FETAL NON-STRESS TEST: CPT

## 2025-01-02 ENCOUNTER — HOSPITAL ENCOUNTER (OUTPATIENT)
Dept: PERINATAL CARE | Facility: HOSPITAL | Age: 40
Discharge: HOME | End: 2025-01-02
Attending: STUDENT IN AN ORGANIZED HEALTH CARE EDUCATION/TRAINING PROGRAM
Payer: COMMERCIAL

## 2025-01-02 VITALS — DIASTOLIC BLOOD PRESSURE: 54 MMHG | SYSTOLIC BLOOD PRESSURE: 95 MMHG

## 2025-01-02 DIAGNOSIS — O30.043 DICHORIONIC DIAMNIOTIC TWIN PREGNANCY IN THIRD TRIMESTER: ICD-10-CM

## 2025-01-02 DIAGNOSIS — Z3A.33 33 WEEKS GESTATION OF PREGNANCY: ICD-10-CM

## 2025-01-02 DIAGNOSIS — O09.523 AMA (ADVANCED MATERNAL AGE) MULTIGRAVIDA 35+, THIRD TRIMESTER: Primary | ICD-10-CM

## 2025-01-02 PROCEDURE — 59025 FETAL NON-STRESS TEST: CPT | Mod: 59

## 2025-01-09 ENCOUNTER — HOSPITAL ENCOUNTER (OUTPATIENT)
Dept: PERINATAL CARE | Facility: HOSPITAL | Age: 40
Discharge: HOME | End: 2025-01-09
Attending: STUDENT IN AN ORGANIZED HEALTH CARE EDUCATION/TRAINING PROGRAM
Payer: COMMERCIAL

## 2025-01-09 VITALS — DIASTOLIC BLOOD PRESSURE: 55 MMHG | SYSTOLIC BLOOD PRESSURE: 90 MMHG

## 2025-01-09 DIAGNOSIS — Z3A.34 34 WEEKS GESTATION OF PREGNANCY: ICD-10-CM

## 2025-01-09 DIAGNOSIS — O30.043 DICHORIONIC DIAMNIOTIC TWIN PREGNANCY IN THIRD TRIMESTER: Primary | Chronic | ICD-10-CM

## 2025-01-09 DIAGNOSIS — O09.523 AMA (ADVANCED MATERNAL AGE) MULTIGRAVIDA 35+, THIRD TRIMESTER: ICD-10-CM

## 2025-01-09 PROCEDURE — 59025 FETAL NON-STRESS TEST: CPT

## 2025-01-13 ENCOUNTER — HOSPITAL ENCOUNTER (OUTPATIENT)
Dept: PERINATAL CARE | Facility: HOSPITAL | Age: 40
Discharge: HOME | End: 2025-01-13
Attending: STUDENT IN AN ORGANIZED HEALTH CARE EDUCATION/TRAINING PROGRAM
Payer: COMMERCIAL

## 2025-01-13 VITALS — DIASTOLIC BLOOD PRESSURE: 55 MMHG | SYSTOLIC BLOOD PRESSURE: 98 MMHG

## 2025-01-13 DIAGNOSIS — O30.043 DICHORIONIC DIAMNIOTIC TWIN PREGNANCY IN THIRD TRIMESTER: Primary | ICD-10-CM

## 2025-01-13 DIAGNOSIS — Z3A.34 34 WEEKS GESTATION OF PREGNANCY: ICD-10-CM

## 2025-01-13 DIAGNOSIS — O09.523 AMA (ADVANCED MATERNAL AGE) MULTIGRAVIDA 35+, THIRD TRIMESTER: ICD-10-CM

## 2025-01-13 PROCEDURE — 59025 FETAL NON-STRESS TEST: CPT | Mod: 59

## 2025-01-16 ENCOUNTER — HOSPITAL ENCOUNTER (OUTPATIENT)
Dept: PERINATAL CARE | Facility: HOSPITAL | Age: 40
Discharge: HOME | End: 2025-01-16
Attending: STUDENT IN AN ORGANIZED HEALTH CARE EDUCATION/TRAINING PROGRAM
Payer: COMMERCIAL

## 2025-01-16 VITALS — SYSTOLIC BLOOD PRESSURE: 100 MMHG | DIASTOLIC BLOOD PRESSURE: 52 MMHG

## 2025-01-16 DIAGNOSIS — Z3A.35 35 WEEKS GESTATION OF PREGNANCY: ICD-10-CM

## 2025-01-16 DIAGNOSIS — O30.043 DICHORIONIC DIAMNIOTIC TWIN PREGNANCY IN THIRD TRIMESTER: Primary | Chronic | ICD-10-CM

## 2025-01-16 PROCEDURE — 59025 FETAL NON-STRESS TEST: CPT

## 2025-01-20 ENCOUNTER — HOSPITAL ENCOUNTER (OUTPATIENT)
Dept: PERINATAL CARE | Facility: HOSPITAL | Age: 40
Discharge: HOME | End: 2025-01-20
Attending: STUDENT IN AN ORGANIZED HEALTH CARE EDUCATION/TRAINING PROGRAM
Payer: COMMERCIAL

## 2025-01-20 DIAGNOSIS — Z3A.35 35 WEEKS GESTATION OF PREGNANCY: ICD-10-CM

## 2025-01-20 DIAGNOSIS — O30.043 DICHORIONIC DIAMNIOTIC TWIN PREGNANCY IN THIRD TRIMESTER: Primary | ICD-10-CM

## 2025-01-20 DIAGNOSIS — O09.523 AMA (ADVANCED MATERNAL AGE) MULTIGRAVIDA 35+, THIRD TRIMESTER: ICD-10-CM

## 2025-01-20 PROCEDURE — 59025 FETAL NON-STRESS TEST: CPT

## 2025-01-23 ENCOUNTER — HOSPITAL ENCOUNTER (OUTPATIENT)
Dept: PERINATAL CARE | Facility: HOSPITAL | Age: 40
Discharge: HOME | End: 2025-01-23
Attending: STUDENT IN AN ORGANIZED HEALTH CARE EDUCATION/TRAINING PROGRAM
Payer: COMMERCIAL

## 2025-01-23 VITALS — DIASTOLIC BLOOD PRESSURE: 51 MMHG | SYSTOLIC BLOOD PRESSURE: 94 MMHG

## 2025-01-23 DIAGNOSIS — Z3A.36 36 WEEKS GESTATION OF PREGNANCY: ICD-10-CM

## 2025-01-23 DIAGNOSIS — O30.043 DICHORIONIC DIAMNIOTIC TWIN PREGNANCY IN THIRD TRIMESTER: Primary | ICD-10-CM

## 2025-01-23 DIAGNOSIS — O09.523 AMA (ADVANCED MATERNAL AGE) MULTIGRAVIDA 35+, THIRD TRIMESTER: ICD-10-CM

## 2025-01-23 PROCEDURE — 59025 FETAL NON-STRESS TEST: CPT | Mod: 59

## 2025-01-27 ENCOUNTER — HOSPITAL ENCOUNTER (OUTPATIENT)
Dept: PERINATAL CARE | Facility: HOSPITAL | Age: 40
Discharge: HOME | End: 2025-01-27
Attending: STUDENT IN AN ORGANIZED HEALTH CARE EDUCATION/TRAINING PROGRAM
Payer: COMMERCIAL

## 2025-01-27 VITALS — SYSTOLIC BLOOD PRESSURE: 101 MMHG | DIASTOLIC BLOOD PRESSURE: 60 MMHG

## 2025-01-27 DIAGNOSIS — O30.043 DICHORIONIC DIAMNIOTIC TWIN PREGNANCY IN THIRD TRIMESTER: ICD-10-CM

## 2025-01-27 DIAGNOSIS — Z3A.36 36 WEEKS GESTATION OF PREGNANCY: ICD-10-CM

## 2025-01-27 DIAGNOSIS — O09.523 AMA (ADVANCED MATERNAL AGE) MULTIGRAVIDA 35+, THIRD TRIMESTER: Primary | ICD-10-CM

## 2025-01-27 PROCEDURE — 59025 FETAL NON-STRESS TEST: CPT | Mod: 59

## 2025-01-30 ENCOUNTER — HOSPITAL ENCOUNTER (OUTPATIENT)
Dept: PERINATAL CARE | Facility: HOSPITAL | Age: 40
Discharge: HOME | End: 2025-01-30
Attending: STUDENT IN AN ORGANIZED HEALTH CARE EDUCATION/TRAINING PROGRAM
Payer: COMMERCIAL

## 2025-01-30 VITALS — SYSTOLIC BLOOD PRESSURE: 97 MMHG | DIASTOLIC BLOOD PRESSURE: 52 MMHG

## 2025-01-30 DIAGNOSIS — O30.043 DICHORIONIC DIAMNIOTIC TWIN PREGNANCY IN THIRD TRIMESTER: Primary | Chronic | ICD-10-CM

## 2025-01-30 DIAGNOSIS — Z3A.37 37 WEEKS GESTATION OF PREGNANCY: ICD-10-CM

## 2025-01-30 DIAGNOSIS — O09.523 AMA (ADVANCED MATERNAL AGE) MULTIGRAVIDA 35+, THIRD TRIMESTER: ICD-10-CM

## 2025-01-30 PROCEDURE — 59025 FETAL NON-STRESS TEST: CPT | Mod: 59

## 2025-02-03 ENCOUNTER — HOSPITAL ENCOUNTER (OUTPATIENT)
Dept: PERINATAL CARE | Facility: HOSPITAL | Age: 40
Discharge: HOME | End: 2025-02-03
Attending: STUDENT IN AN ORGANIZED HEALTH CARE EDUCATION/TRAINING PROGRAM
Payer: COMMERCIAL

## 2025-02-03 VITALS — SYSTOLIC BLOOD PRESSURE: 95 MMHG | DIASTOLIC BLOOD PRESSURE: 53 MMHG

## 2025-02-03 DIAGNOSIS — O30.043 DICHORIONIC DIAMNIOTIC TWIN PREGNANCY IN THIRD TRIMESTER: Primary | ICD-10-CM

## 2025-02-03 DIAGNOSIS — O09.523 AMA (ADVANCED MATERNAL AGE) MULTIGRAVIDA 35+, THIRD TRIMESTER: ICD-10-CM

## 2025-02-03 DIAGNOSIS — Z3A.37 37 WEEKS GESTATION OF PREGNANCY: ICD-10-CM

## 2025-02-03 PROCEDURE — 59025 FETAL NON-STRESS TEST: CPT

## 2025-02-05 ENCOUNTER — APPOINTMENT (OUTPATIENT)
Dept: LAB | Facility: HOSPITAL | Age: 40
End: 2025-02-05
Attending: OBSTETRICS & GYNECOLOGY
Payer: COMMERCIAL

## 2025-02-05 ENCOUNTER — TRANSCRIBE ORDERS (OUTPATIENT)
Dept: PREADMISSION TESTING | Facility: HOSPITAL | Age: 40
End: 2025-02-05

## 2025-02-05 DIAGNOSIS — O30.043 TWIN PREGNANCY, DICHORIONIC/DIAMNIOTIC, THIRD TRIMESTER: ICD-10-CM

## 2025-02-05 LAB
ABO + RH BLD: NORMAL
BLD GP AB SCN SERPL QL: NEGATIVE
BLOOD BANK CMNT PATIENT-IMP: NORMAL
D AG BLD QL: POSITIVE
ERYTHROCYTE [DISTWIDTH] IN BLOOD BY AUTOMATED COUNT: 14.4 % (ref 11.7–14.4)
HCT VFR BLD AUTO: 38.9 % (ref 35–45)
HGB BLD-MCNC: 12.6 G/DL (ref 11.8–15.7)
LABORATORY COMMENT REPORT: NORMAL
MCH RBC QN AUTO: 31.8 PG (ref 28–33.2)
MCHC RBC AUTO-ENTMCNC: 32.4 G/DL (ref 32.2–35.5)
MCV RBC AUTO: 98.2 FL (ref 83–98)
PLATELET # BLD AUTO: 176 K/UL (ref 150–369)
PMV BLD AUTO: 12.8 FL (ref 9.4–12.3)
RBC # BLD AUTO: 3.96 M/UL (ref 3.93–5.22)
SPECIMEN EXP DATE BLD: NORMAL
WBC # BLD AUTO: 8.54 K/UL (ref 3.8–10.5)

## 2025-02-05 PROCEDURE — 85027 COMPLETE CBC AUTOMATED: CPT

## 2025-02-05 PROCEDURE — 36415 COLL VENOUS BLD VENIPUNCTURE: CPT

## 2025-02-05 PROCEDURE — 86901 BLOOD TYPING SEROLOGIC RH(D): CPT

## 2025-02-05 RX ORDER — TRANEXAMIC ACID 10 MG/ML
1000 INJECTION, SOLUTION INTRAVENOUS ONCE AS NEEDED
Status: COMPLETED | OUTPATIENT
Start: 2025-02-05 | End: 2025-02-06

## 2025-02-05 RX ORDER — METHYLERGONOVINE MALEATE 0.2 MG/ML
0.2 INJECTION INTRAVENOUS ONCE AS NEEDED
Status: DISCONTINUED | OUTPATIENT
Start: 2025-02-05 | End: 2025-02-06

## 2025-02-05 RX ORDER — MISOPROSTOL 200 UG/1
1000 TABLET ORAL ONCE AS NEEDED
Status: DISCONTINUED | OUTPATIENT
Start: 2025-02-05 | End: 2025-02-06

## 2025-02-05 RX ORDER — CARBOPROST TROMETHAMINE 250 UG/ML
250 INJECTION, SOLUTION INTRAMUSCULAR ONCE AS NEEDED
Status: DISCONTINUED | OUTPATIENT
Start: 2025-02-05 | End: 2025-02-06

## 2025-02-05 RX ORDER — OXYTOCIN 10 [USP'U]/ML
10 INJECTION, SOLUTION INTRAMUSCULAR; INTRAVENOUS ONCE AS NEEDED
Status: DISCONTINUED | OUTPATIENT
Start: 2025-02-05 | End: 2025-02-06

## 2025-02-05 NOTE — H&P
HPI     Karlos Orosco is a 39 y.o. female  at 37w6d with an estimated due date of 2025, by Patient Reported with di/di twin pregnancy and malpresentation who presents for schedule c section     Prenatal Care Axia [facility]  AMA low risk NIPT, di/di twin IUP concordant growth,      Last PO intake:   ,     ,      OB History:   OB History    Para Term  AB Living   2 0 0 0 0 0   SAB IAB Ectopic Multiple Live Births   0 0 0 0 0      # Outcome Date GA Lbr Matt/2nd Weight Sex Type Anes PTL Lv   2 Current            1                 Medical History: No past medical history on file.    Surgical History:   Past Surgical History   Procedure Laterality Date    D&C, CERVICAL DILATION/DILATATION N/A 2023    Performed by Mary Hernandez MD at [facility] OR [facility]    D&E N/A 2024    Performed by Jose Joya DO at [facility] OR [facility]       Social History:   Social History     Socioeconomic History    Marital status:    Tobacco Use    Smoking status: Never    Smokeless tobacco: Never   Substance and Sexual Activity    Alcohol use: Never    Drug use: Never    Sexual activity: Yes     Partners: Male     Birth control/protection: None     Social Drivers of Health     Food Insecurity: No Food Insecurity (2024)    Hunger Vital Sign     Worried About Running Out of Food in the Last Year: Never true     Ran Out of Food in the Last Year: Never true        Family History: No family history on file.    Allergies: Patient has no known allergies.    Prior to Admission medications    Medication Sig Start Date End Date Taking? Authorizing Provider   prenatal vit no.130-iron-folic 27 mg iron- 800 mcg tablet tablet Take 1 tablet by mouth daily.    Provider, [facility] MD Claus       Review of Systems  Pertinent items are noted in HPI.    Objective     Vital Signs for the last 24 hours:       Latest cervical exam:              Fetal Monitoring:  FHR Baseline: Twin A 135/Twin B 140  FHR Variability:  mod  FHR Accelerations: present  FHR Decelerations: prolonged deceleration twin B     Contraction Frequency: irregular     Exam:  General Appearance: Alert, cooperative, no acute distress  Lungs: respirations unlabored  Heart: Regular rate  Abdomen: gravid, nontender  Genitalia: See vaginal exam  Extremities: no edema or calf tenderness  Neurologic: grossly intact without focal deficits    Ultrasounds:   I have reviewed the applicable Ultrasounds.    Bedside Ultrasounds:   Transverse x 2      Labs:  ABO   Date Value Ref Range Status   2024 A  Final     Rh Factor   Date Value Ref Range Status   2024 Positive  Final       Assessment/Plan     Karlos Orosco is a 39 y.o. female  at 37w6d admitted for  for di/di twin pregnancy with malpresentation    Admit to L&D for C/S, NPO, IV fluids, pre-op meds, patient counseled and consented for surgery offered a  which patient declined and preferred to have her  translate. Reviewed surgical expectations and risks including but not limited to bleeding, transfusion, infection, injury to surrounding organs, possible hysterectomy, risks to future pregnancy including scarring/abnormal placentation, all questions answered and informed consent obtained.     Twin B with prolonged deceleration and recovery with repositioning, on call to OR     FHR: Category II  GBS: negative  Estimated discharge date:     Daphney Villeda MD

## 2025-02-06 ENCOUNTER — HOSPITAL ENCOUNTER (INPATIENT)
Facility: HOSPITAL | Age: 40
LOS: 3 days | Discharge: HOME | End: 2025-02-09
Attending: OBSTETRICS & GYNECOLOGY | Admitting: OBSTETRICS & GYNECOLOGY
Payer: COMMERCIAL

## 2025-02-06 ENCOUNTER — ANESTHESIA (INPATIENT)
Dept: OBSTETRICS AND GYNECOLOGY | Facility: HOSPITAL | Age: 40
End: 2025-02-06
Payer: COMMERCIAL

## 2025-02-06 ENCOUNTER — ANESTHESIA EVENT (INPATIENT)
Dept: OBSTETRICS AND GYNECOLOGY | Facility: HOSPITAL | Age: 40
End: 2025-02-06
Payer: COMMERCIAL

## 2025-02-06 DIAGNOSIS — G89.18 POSTOPERATIVE PAIN: Primary | ICD-10-CM

## 2025-02-06 DIAGNOSIS — O30.043 TWIN PREGNANCY, DICHORIONIC/DIAMNIOTIC, THIRD TRIMESTER: ICD-10-CM

## 2025-02-06 LAB
ABO + RH BLD: NORMAL
BLD GP AB SCN SERPL QL: NEGATIVE
D AG BLD QL: POSITIVE
ERYTHROCYTE [DISTWIDTH] IN BLOOD BY AUTOMATED COUNT: 14.5 % (ref 11.7–14.4)
HBV SURFACE AG SER QL: NONREACTIVE
HCT VFR BLD AUTO: 41.2 % (ref 35–45)
HGB BLD-MCNC: 13.5 G/DL (ref 11.8–15.7)
LABORATORY COMMENT REPORT: NORMAL
MCH RBC QN AUTO: 31.9 PG (ref 28–33.2)
MCHC RBC AUTO-ENTMCNC: 32.8 G/DL (ref 32.2–35.5)
MCV RBC AUTO: 97.4 FL (ref 83–98)
PLATELET # BLD AUTO: 201 K/UL (ref 150–369)
PMV BLD AUTO: 13 FL (ref 9.4–12.3)
RBC # BLD AUTO: 4.23 M/UL (ref 3.93–5.22)
SPECIMEN EXP DATE BLD: NORMAL
T PALLIDUM AB SER QL IF: NONREACTIVE
WBC # BLD AUTO: 8.52 K/UL (ref 3.8–10.5)

## 2025-02-06 PROCEDURE — 63700000 HC SELF-ADMINISTRABLE DRUG: Performed by: OBSTETRICS & GYNECOLOGY

## 2025-02-06 PROCEDURE — 88307 TISSUE EXAM BY PATHOLOGIST: CPT | Performed by: OBSTETRICS & GYNECOLOGY

## 2025-02-06 PROCEDURE — 59514 CESAREAN DELIVERY ONLY: CPT | Mod: 82 | Performed by: OBSTETRICS & GYNECOLOGY

## 2025-02-06 PROCEDURE — 63600000 HC DRUGS/DETAIL CODE: Mod: JZ | Performed by: OBSTETRICS & GYNECOLOGY

## 2025-02-06 PROCEDURE — 87340 HEPATITIS B SURFACE AG IA: CPT | Performed by: OBSTETRICS & GYNECOLOGY

## 2025-02-06 PROCEDURE — 72000022 HC C SECTION LEVEL 2: Performed by: OBSTETRICS & GYNECOLOGY

## 2025-02-06 PROCEDURE — 36415 COLL VENOUS BLD VENIPUNCTURE: CPT | Performed by: OBSTETRICS & GYNECOLOGY

## 2025-02-06 PROCEDURE — 25000000 HC PHARMACY GENERAL: Mod: JW | Performed by: ANESTHESIOLOGY

## 2025-02-06 PROCEDURE — 71000011 HC PACU PHASE 1 EA ADDL MIN: Performed by: OBSTETRICS & GYNECOLOGY

## 2025-02-06 PROCEDURE — 63600000 HC DRUGS/DETAIL CODE: Mod: JZ | Performed by: ANESTHESIOLOGY

## 2025-02-06 PROCEDURE — 25800000 HC PHARMACY IV SOLUTIONS: Performed by: OBSTETRICS & GYNECOLOGY

## 2025-02-06 PROCEDURE — 86780 TREPONEMA PALLIDUM: CPT | Performed by: OBSTETRICS & GYNECOLOGY

## 2025-02-06 PROCEDURE — 85027 COMPLETE CBC AUTOMATED: CPT | Performed by: OBSTETRICS & GYNECOLOGY

## 2025-02-06 PROCEDURE — 27200121 HC CATH FOLEY

## 2025-02-06 PROCEDURE — 25000000 HC PHARMACY GENERAL: Performed by: OBSTETRICS & GYNECOLOGY

## 2025-02-06 PROCEDURE — 86901 BLOOD TYPING SEROLOGIC RH(D): CPT

## 2025-02-06 PROCEDURE — 37000010 HC ANESTHESIA SPINAL: Performed by: OBSTETRICS & GYNECOLOGY

## 2025-02-06 PROCEDURE — 86900 BLOOD TYPING SEROLOGIC ABO: CPT

## 2025-02-06 PROCEDURE — 86850 RBC ANTIBODY SCREEN: CPT

## 2025-02-06 PROCEDURE — 25800000 HC PHARMACY IV SOLUTIONS: Performed by: ANESTHESIOLOGY

## 2025-02-06 PROCEDURE — 71000001 HC PACU PHASE 1 INITIAL 30MIN: Performed by: OBSTETRICS & GYNECOLOGY

## 2025-02-06 PROCEDURE — 12000000 HC ROOM AND CARE MED/SURG

## 2025-02-06 RX ORDER — ONDANSETRON HYDROCHLORIDE 2 MG/ML
4 INJECTION, SOLUTION INTRAVENOUS EVERY 6 HOURS PRN
Status: DISCONTINUED | OUTPATIENT
Start: 2025-02-06 | End: 2025-02-09 | Stop reason: HOSPADM

## 2025-02-06 RX ORDER — CALCIUM CARBONATE 200(500)MG
200 TABLET,CHEWABLE ORAL EVERY 4 HOURS PRN
Status: DISCONTINUED | OUTPATIENT
Start: 2025-02-06 | End: 2025-02-09 | Stop reason: HOSPADM

## 2025-02-06 RX ORDER — MORPHINE SULFATE 0.5 MG/ML
INJECTION, SOLUTION EPIDURAL; INTRATHECAL; INTRAVENOUS
Status: COMPLETED | OUTPATIENT
Start: 2025-02-06 | End: 2025-02-06

## 2025-02-06 RX ORDER — OXYCODONE HYDROCHLORIDE 5 MG/1
5 TABLET ORAL EVERY 4 HOURS PRN
Status: DISCONTINUED | OUTPATIENT
Start: 2025-02-06 | End: 2025-02-09 | Stop reason: HOSPADM

## 2025-02-06 RX ORDER — ACETAMINOPHEN 650 MG/1
650 SUPPOSITORY RECTAL ONCE
Status: COMPLETED | OUTPATIENT
Start: 2025-02-06 | End: 2025-02-06

## 2025-02-06 RX ORDER — MORPHINE SULFATE 0.5 MG/ML
INJECTION, SOLUTION EPIDURAL; INTRATHECAL; INTRAVENOUS
Status: COMPLETED
Start: 2025-02-06 | End: 2025-02-06

## 2025-02-06 RX ORDER — KETOROLAC TROMETHAMINE 30 MG/ML
30 INJECTION, SOLUTION INTRAMUSCULAR; INTRAVENOUS
Status: COMPLETED | OUTPATIENT
Start: 2025-02-06 | End: 2025-02-08

## 2025-02-06 RX ORDER — PROCHLORPERAZINE EDISYLATE 5 MG/ML
10 INJECTION INTRAMUSCULAR; INTRAVENOUS EVERY 6 HOURS PRN
Status: DISCONTINUED | OUTPATIENT
Start: 2025-02-06 | End: 2025-02-09 | Stop reason: HOSPADM

## 2025-02-06 RX ORDER — TRANEXAMIC ACID 10 MG/ML
INJECTION, SOLUTION INTRAVENOUS
Status: COMPLETED
Start: 2025-02-06 | End: 2025-02-06

## 2025-02-06 RX ORDER — SODIUM CITRATE AND CITRIC ACID MONOHYDRATE 334; 500 MG/5ML; MG/5ML
30 SOLUTION ORAL ONCE
Status: COMPLETED | OUTPATIENT
Start: 2025-02-06 | End: 2025-02-06

## 2025-02-06 RX ORDER — DIPHENHYDRAMINE HCL 50 MG/ML
25 VIAL (ML) INJECTION EVERY 6 HOURS PRN
Status: DISCONTINUED | OUTPATIENT
Start: 2025-02-06 | End: 2025-02-09 | Stop reason: HOSPADM

## 2025-02-06 RX ORDER — ACETAMINOPHEN 325 MG/1
975 TABLET ORAL
Status: DISCONTINUED | OUTPATIENT
Start: 2025-02-06 | End: 2025-02-09 | Stop reason: HOSPADM

## 2025-02-06 RX ORDER — EPHEDRINE SULFATE 50 MG/ML
INJECTION, SOLUTION INTRAVENOUS AS NEEDED
Status: DISCONTINUED | OUTPATIENT
Start: 2025-02-06 | End: 2025-02-06 | Stop reason: SURG

## 2025-02-06 RX ORDER — IBUPROFEN 600 MG/1
600 TABLET ORAL EVERY 6 HOURS PRN
Status: DISCONTINUED | OUTPATIENT
Start: 2025-02-08 | End: 2025-02-09 | Stop reason: HOSPADM

## 2025-02-06 RX ORDER — OXYCODONE HYDROCHLORIDE 5 MG/1
2.5 TABLET ORAL EVERY 4 HOURS PRN
Status: DISCONTINUED | OUTPATIENT
Start: 2025-02-06 | End: 2025-02-09 | Stop reason: HOSPADM

## 2025-02-06 RX ORDER — PHENYLEPHRINE HYDROCHLORIDE 10 MG/ML
INJECTION INTRAVENOUS AS NEEDED
Status: DISCONTINUED | OUTPATIENT
Start: 2025-02-06 | End: 2025-02-06 | Stop reason: SURG

## 2025-02-06 RX ORDER — ACETAMINOPHEN 325 MG/1
975 TABLET ORAL ONCE
Status: COMPLETED | OUTPATIENT
Start: 2025-02-06 | End: 2025-02-06

## 2025-02-06 RX ORDER — FENTANYL CITRATE 50 UG/ML
INJECTION, SOLUTION INTRAMUSCULAR; INTRAVENOUS
Status: COMPLETED | OUTPATIENT
Start: 2025-02-06 | End: 2025-02-06

## 2025-02-06 RX ORDER — ONDANSETRON 4 MG/1
4 TABLET, ORALLY DISINTEGRATING ORAL EVERY 8 HOURS PRN
Status: DISCONTINUED | OUTPATIENT
Start: 2025-02-06 | End: 2025-02-09 | Stop reason: HOSPADM

## 2025-02-06 RX ORDER — BUPIVACAINE HYDROCHLORIDE 7.5 MG/ML
INJECTION, SOLUTION INTRASPINAL
Status: COMPLETED | OUTPATIENT
Start: 2025-02-06 | End: 2025-02-06

## 2025-02-06 RX ORDER — ONDANSETRON HYDROCHLORIDE 2 MG/ML
4 INJECTION, SOLUTION INTRAVENOUS EVERY 8 HOURS PRN
Status: DISCONTINUED | OUTPATIENT
Start: 2025-02-06 | End: 2025-02-09 | Stop reason: HOSPADM

## 2025-02-06 RX ORDER — NALBUPHINE HYDROCHLORIDE 10 MG/ML
2.5 INJECTION INTRAMUSCULAR; INTRAVENOUS; SUBCUTANEOUS ONCE AS NEEDED
Status: DISCONTINUED | OUTPATIENT
Start: 2025-02-06 | End: 2025-02-09 | Stop reason: HOSPADM

## 2025-02-06 RX ORDER — KETOROLAC TROMETHAMINE 30 MG/ML
30 INJECTION, SOLUTION INTRAMUSCULAR; INTRAVENOUS
Status: COMPLETED | OUTPATIENT
Start: 2025-02-06 | End: 2025-02-06

## 2025-02-06 RX ORDER — SODIUM CHLORIDE, SODIUM LACTATE, POTASSIUM CHLORIDE, CALCIUM CHLORIDE 600; 310; 30; 20 MG/100ML; MG/100ML; MG/100ML; MG/100ML
150 INJECTION, SOLUTION INTRAVENOUS CONTINUOUS
Status: DISCONTINUED | OUTPATIENT
Start: 2025-02-06 | End: 2025-02-06

## 2025-02-06 RX ORDER — OXYTOCIN/0.9 % SODIUM CHLORIDE 30/500 ML
83 PLASTIC BAG, INJECTION (ML) INTRAVENOUS CONTINUOUS
Status: DISCONTINUED | OUTPATIENT
Start: 2025-02-06 | End: 2025-02-06

## 2025-02-06 RX ORDER — ALUMINUM HYDROXIDE, MAGNESIUM HYDROXIDE, AND SIMETHICONE 1200; 120; 1200 MG/30ML; MG/30ML; MG/30ML
30 SUSPENSION ORAL EVERY 4 HOURS PRN
Status: DISCONTINUED | OUTPATIENT
Start: 2025-02-06 | End: 2025-02-09 | Stop reason: HOSPADM

## 2025-02-06 RX ORDER — ACETAMINOPHEN 650 MG/20.3ML
1000 LIQUID ORAL ONCE
Status: COMPLETED | OUTPATIENT
Start: 2025-02-06 | End: 2025-02-06

## 2025-02-06 RX ORDER — FENTANYL CITRATE 50 UG/ML
25 INJECTION, SOLUTION INTRAMUSCULAR; INTRAVENOUS EVERY 5 MIN PRN
Status: DISCONTINUED | OUTPATIENT
Start: 2025-02-06 | End: 2025-02-09 | Stop reason: HOSPADM

## 2025-02-06 RX ORDER — IBUPROFEN 600 MG/1
600 TABLET ORAL
Status: COMPLETED | OUTPATIENT
Start: 2025-02-06 | End: 2025-02-08

## 2025-02-06 RX ORDER — OXYTOCIN/0.9 % SODIUM CHLORIDE 30/500 ML
667 PLASTIC BAG, INJECTION (ML) INTRAVENOUS ONCE
Status: DISCONTINUED | OUTPATIENT
Start: 2025-02-06 | End: 2025-02-06

## 2025-02-06 RX ORDER — DIPHENHYDRAMINE HCL 25 MG
25 CAPSULE ORAL EVERY 6 HOURS PRN
Status: DISCONTINUED | OUTPATIENT
Start: 2025-02-06 | End: 2025-02-09 | Stop reason: HOSPADM

## 2025-02-06 RX ORDER — ONDANSETRON 8 MG/1
8 TABLET, ORALLY DISINTEGRATING ORAL ONCE
Status: COMPLETED | OUTPATIENT
Start: 2025-02-06 | End: 2025-02-06

## 2025-02-06 RX ORDER — AMOXICILLIN 250 MG
1 CAPSULE ORAL 2 TIMES DAILY
Status: DISCONTINUED | OUTPATIENT
Start: 2025-02-06 | End: 2025-02-09 | Stop reason: HOSPADM

## 2025-02-06 RX ORDER — FENTANYL CITRATE 50 UG/ML
INJECTION, SOLUTION INTRAMUSCULAR; INTRAVENOUS
Status: COMPLETED
Start: 2025-02-06 | End: 2025-02-06

## 2025-02-06 RX ADMIN — CEFAZOLIN 2 G: 2 INJECTION, POWDER, FOR SOLUTION INTRAMUSCULAR; INTRAVENOUS at 10:28

## 2025-02-06 RX ADMIN — TRANEXAMIC ACID 1000 MG: 10 INJECTION, SOLUTION INTRAVENOUS at 11:19

## 2025-02-06 RX ADMIN — SODIUM CHLORIDE, POTASSIUM CHLORIDE, SODIUM LACTATE AND CALCIUM CHLORIDE: 600; 310; 30; 20 INJECTION, SOLUTION INTRAVENOUS at 10:36

## 2025-02-06 RX ADMIN — EPHEDRINE SULFATE 10 MG: 50 INJECTION, SOLUTION INTRAVENOUS at 11:02

## 2025-02-06 RX ADMIN — ACETAMINOPHEN 975 MG: 325 TABLET ORAL at 22:14

## 2025-02-06 RX ADMIN — ACETAMINOPHEN 975 MG: 325 TABLET ORAL at 16:24

## 2025-02-06 RX ADMIN — SENNOSIDES AND DOCUSATE SODIUM 1 TABLET: 50; 8.6 TABLET ORAL at 20:37

## 2025-02-06 RX ADMIN — OXYTOCIN 20 UNITS: 10 INJECTION INTRAVENOUS at 11:17

## 2025-02-06 RX ADMIN — KETOROLAC TROMETHAMINE 30 MG: 30 INJECTION, SOLUTION INTRAMUSCULAR at 13:54

## 2025-02-06 RX ADMIN — MORPHINE SULFATE 0.15 MG: 0.5 INJECTION, SOLUTION EPIDURAL; INTRATHECAL; INTRAVENOUS at 10:46

## 2025-02-06 RX ADMIN — PRENATAL VIT W/ FE FUMARATE-FA TAB 27-0.8 MG 1 TABLET: 27-0.8 TAB at 16:25

## 2025-02-06 RX ADMIN — ACETAMINOPHEN 975 MG: 325 TABLET ORAL at 10:28

## 2025-02-06 RX ADMIN — SODIUM CHLORIDE, POTASSIUM CHLORIDE, SODIUM LACTATE AND CALCIUM CHLORIDE: 600; 310; 30; 20 INJECTION, SOLUTION INTRAVENOUS at 11:24

## 2025-02-06 RX ADMIN — BUPIVACAINE HYDROCHLORIDE IN DEXTROSE 1.6 ML: 7.5 INJECTION, SOLUTION SUBARACHNOID at 10:46

## 2025-02-06 RX ADMIN — KETOROLAC TROMETHAMINE 30 MG: 30 INJECTION, SOLUTION INTRAMUSCULAR at 20:37

## 2025-02-06 RX ADMIN — SODIUM CITRATE AND CITRIC ACID MONOHYDRATE 30 ML: 500; 334 SOLUTION ORAL at 10:28

## 2025-02-06 RX ADMIN — FENTANYL CITRATE 10 MCG: 50 INJECTION INTRAMUSCULAR; INTRAVENOUS at 10:46

## 2025-02-06 RX ADMIN — PHENYLEPHRINE HYDROCHLORIDE 200 MCG: 10 INJECTION INTRAVENOUS at 10:52

## 2025-02-06 RX ADMIN — EPHEDRINE SULFATE 10 MG: 50 INJECTION, SOLUTION INTRAVENOUS at 10:58

## 2025-02-06 RX ADMIN — PHENYLEPHRINE HYDROCHLORIDE 200 MCG: 10 INJECTION INTRAVENOUS at 10:58

## 2025-02-06 RX ADMIN — ONDANSETRON 8 MG: 8 TABLET, ORALLY DISINTEGRATING ORAL at 10:28

## 2025-02-06 ASSESSMENT — PAIN SCALES - GENERAL: PAIN_LEVEL: 0

## 2025-02-06 NOTE — ANESTHESIA PROCEDURE NOTES
Spinal Block    Patient location during procedure: OR  Start time: 2/6/2025 10:42 AM  End time: 2/6/2025 10:46 AM  Staffing  Performed: anesthesiologist   Anesthesiologist: Fabio Huff MD  Performed by: Fabio Huff MD  Authorized by: Fabio Huff MD    Reason for block: primary anesthetic  Preanesthetic Checklist  Completed: patient identified, surgical consent, pre-op evaluation, timeout performed, IV checked, risks and benefits discussed, monitors and equipment checked and sterile field maintained during procedure  Spinal Block  Patient position: sitting  Prep: ChloraPrep and site prepped and draped  Patient monitoring: heart rate, cardiac monitor, continuous pulse ox and blood pressure  Approach: midline  Location: L3-4  Injection technique: single-shot  Needle  Needle type: Sprotte   Needle gauge: 24 G  Needle length: 3.5 in  Assessment  Events: cerebrospinal fluid  Additional Notes  Procedure well tolerated. Vital signs stable.    Medications Administered -   fentaNYL (PF) (SUBLIMAZE) injection - intrathecal   10 mcg - 2/6/2025 10:46:00 AM  bupivacaine PF (MARCAINE SPINAL) 0.75% intrathecal injection - intrathecal   1.6 mL - 2/6/2025 10:46:00 AM  morphine (DURAMORPH) PF injection 0.5 mg/mL - intrathecal   0.15 mg - 2/6/2025 10:46:00 AM

## 2025-02-06 NOTE — ANESTHESIOLOGIST PRE-PROCEDURE ATTESTATION
Pre-Procedure Patient Identification:  I am the Primary Anesthesiologist and have identified the patient on 02/06/25 at 12:02 PM.   I have confirmed the procedure(s) will be performed by the following surgeon/proceduralist Daphney Villeda MD.

## 2025-02-06 NOTE — OP NOTE
OB  Delivery OP Note    Date of Procedure: 2025  Patient:Karlos Orosco  :1985    Procedure:    PRIMARY   CPT(R) Code:  48651 - [place] FULL ROUT OBSTE CARE, DELIV    Review the Delivery Report for details.      Details    Pre-Op Diagnosis: 1. 39 y.o.  Intrauterine pregnancy at 38w0d  2. Di Di twin pregnancy   3. Malpresentation, transverse lie x 2   4. AMA    Post-Op Diagnosis: 1. Same delivered viable male infants    Indication: Other (Add Comments)   Procedure:    Kwesi, Boy A [[id number]]         Kwesi, Boy B [[id number]]          Kwesi, Boy A [[id number]]         Kwesi, Boy B [[id number]]     via      Kwesi, Boy A [[id number]]         Kwesi, Boy B [[id number]]    uterine incision and      Kwesi, Boy A [[id number]]         Kwesi, Boy B [[id number]]    skin incision.   Anesthesia:    Kwesi, Boy A [[id number]]         Kwesi, Boy B [[id number]]       Findings: Normal uterus, tubes, and ovaries.  Small subcentimeter paratubal cysts bilaterally and filmy bowel adhesion on left    EBL  900 mL   Complications: None     Specimen Information:  ID Type Source Tests Collected by Time Destination   1 : placenta baby A Placenta Placenta PATHOLOGY TISSUE EXAM Daphney Villeda MD 2025 1057    2 : placenta baby B Placenta Placenta PATHOLOGY TISSUE EXAM Daphney Villeda MD 2025 1057      Drains: Turner draining clear    Staff:  Surgeon(s):  Daphney Villeda MD Desai, Anagha, MD  Anesthesiologist: Fabio Huff MD   Circulator: Mary Ellen Wagner RN  Nurse Practitioner: Anali Christina CRNP  Scrub Person: Rob Higgins RN  Baby Nurse: Kavitha Wood, SUSHIL; Inessa Cole, RN  Other Staff:     Kwesi Boy A [[id number]]         Kwesi, Boy B [[id number]]          Kwesi, Boy A [[id number]]   Delivery Assist;Delivery Nurse;Nursery Nurse      Kwesi, Boy B [[id number]]   Delivery Assist;Delivery Nurse;Nursery Nurse    INFANT  INFORMATION  Time of Birth:     Dane Orosco A [[id number]]   11:16 AM      Dane Orosco [[id number]]   11:17 AM  Presentation:      Dane Orosco A [[id number]]         KwesiDane fisher B [[id number]]      Position:     Kwesi, Dane A [[id number]]         Kwesi, Dane B [[id number]]    ,     Kwesi, Dane A [[id number]]         Kwesi, Boy B [[id number]]    ,     Kwesi, Boy A [[id number]]         Kwesi, Boy B [[id number]]    ,     Kwesi, Dane A [[id number]]         Kwesi, Dane B [[id number]]      Cord:      Kwesi, Dane A [[id number]]         Kwesi, Boy B [[id number]]    ,Complications:     KwesiDane A [[id number]]         Kwesi, Dane B [[id number]]       Sex:      Dane Orosco A [[id number]]   male      Kwesi, Dane B [[id number]]   male  [place] Weight:      Dane Orosco A [[id number]]   2.954 kg (6 lb 8.2 oz)      Dane Orosco B [[id number]]   2.71 kg (5 lb 15.6 oz)     1 Minute 5 Minute 10 Minute   Apgar Totals:    Dane Orosco A [[id number]]         KwesiDane B [[id number]]         KwesiDane A [[id number]]         KwesiDane B [[id number]]         Kwesi, Dane A [[id number]]         KwesiDane fisher B [[id number]]          Information for the patient's :  Dane Orosco A [[id number]]     [place] Cord Gas       None           Information for the patient's :  Dane Orosco B [[id number]]     [place] Cord Gas       None            Informed Consent:  An informed consent was obtained.    Procedure Details:  The patient was taken to the operating room where      Dane Orosco A [[id number]]         Dane Orosco B [[id number]]     anesthesia was placed and found to be adequate. Antibiotics were given for infection prophylaxis. The patient was prepped and draped in the normal sterile fashion.  A timeout was performed.  A Pfannenstiel skin incision was made with the scalpel. The incision was carried down to the fascia using the bovie. The fascia was incised and this was extended laterally  with the bovie. The fascia was grasped with Kocher clamps and the underlying rectus muscle was dissected off.  The rectus muscles were  bluntly. The peritoneum was identified and entered sharply. The peritoneum was dissected to allow for adequate visualization.    The Hiro retractor was inserted. The vesicouterine peritoneum was identified and a bladder flap created  using Metzenbaum and developed digitally . The lower uterine segment was then incised with a low transverse     Kwesi, Boy A [[id number]]         Kwesi, Boy B [[id number]]     incision and was extended bluntly. The amniotic sac was ruptured and      Kwesi, Boy A [[id number]]         Kwesi, Boy B [[id number]]     fluid noted. Twin A's feet were brought to the hysterotomy and delivered atraumatically using the usual breech maneuvers. The remainder of the infant was delivered, a spontaneous cry was heard, and the infant appeared to be moving all 4 extremities. The cord clamped and cut, and the baby was handed off to the awaiting clinicians and neonatology staff. Twin B's membranes were ruptured and head brought to hysterotomy and infant delivered atraumatically using the usual maneuvers. The cord clamped and cut, and the baby was handed off to the awaiting clinicians and neonatology staff.     The placenta was removed manually. The uterus was then exteriorized and cleared of all clots and debris. Patient received prophylactic TXA. The hysterotomy was repaired with 0 Vicryl in a running locked fashion. A second imbricating layer of the same suture was placed and good hemostasis observed. The ovaries and tubes appeared normal bilaterally with very small sub centimeter paratubal cysts. The uterus was then returned to the abdomen. The uterine incision was reinspected and found to be hemostatic. There was small amount of oozing from serosal edges, hemostasis achieved using the Bovie and Shanell applied to hysterotomy The gutters were inspected  and cleared of all debris. The fascia was then reapproximated with a running suture of 0 Vicryl. The subcutaneous layer was irrigated and re-approximated with 2-0 plain interrupted sutures. The skin was closed with 4-0 Monocryl. Steri strips and abdominal dressing applied to skin incision.     The patient tolerated the procedure well. The sponge, instrument, and needle counts were correct and the patient was taken to recovery in stable condition.    Attending Attestation: I performed the procedure.    Due to complex nature of the surgery, surgical assistance was required by Dr. Arauz for retraction, exposure, delivery of the fetus and closure.     Daphney Villeda MD

## 2025-02-06 NOTE — ANESTHESIA POSTPROCEDURE EVALUATION
Patient: Karlos Orosco    Procedure Summary       Date: 25 Room / Location: [facility] LD OR  [facility] L&D OR    Anesthesia Start: 1036 Anesthesia Stop: 1202    Procedure: PRIMARY  (Pelvis) Diagnosis:       Twin pregnancy, dichorionic/diamniotic, third trimester      Spontaneous breech delivery, single or unspecified fetus      (Twin pregnancy, dichorionic/diamniotic, third trimester [O30.043])      (Spontaneous breech delivery, single or unspecified fetus [O32.1XX0])    Surgeons: Daphney Villeda MD Responsible Provider: Fabio Huff MD    Anesthesia Type: spinal ASA Status: 2            Anesthesia Type: spinal  PACU Vitals    No data found in the last 10 encounters.           Anesthesia Post Evaluation    Pain score: 0  Pain management: satisfactory to patient  Mode of pain management: IV medication  Patient location during evaluation: PACU  Patient participation: complete - patient participated  Level of consciousness: awake  Cardiovascular status: acceptable  Airway Patency: adequate  Respiratory status: acceptable  Hydration status: stable  Anesthetic complications: no

## 2025-02-06 NOTE — NURSING NOTE
Pt seq teds removed.  Turner catheter d/c'd.  Pt OOB with assistance.  Pericare explained with good understanding.  Pt encouraged to drink fluids.  Pt asked to use call bell for assistance.

## 2025-02-06 NOTE — ANESTHESIA PREPROCEDURE EVALUATION
"        ROS/Med Hx    Relevant Problems   HEMATOLOGY   (+) Anemia     Karlos Orosco, is a 39 y.o. year old female presenting for PRIMARY  Under  *  Anesthesia      History reviewed. No pertinent past medical history.       Past Surgical History   Procedure Laterality Date    D&C, CERVICAL DILATION/DILATATION N/A 2023    Performed by Mary Hernandez MD at [facility] OR [facility]    D&E N/A 2024    Performed by Jose Joya DO at [facility] OR [facility]    Dilation and evacuation              No Known Allergies     Meds:   Prior to Admission medications    Medication Sig Start Date End Date Taking? Authorizing Provider   prenatal vit no.130-iron-folic 27 mg iron- 800 mcg tablet tablet Take 1 tablet by mouth daily.   Yes Provider, Maria Devlin MD          Wt Readings from Last 1 Encounters:   25 85.3 kg (188 lb)      Ht Readings from Last 1 Encounters:   25 1.676 m (5' 6\")      BMI Readings from Last 1 Encounters:   25 30.34 kg/m²       BP Readings from Last 1 Encounters:   25 (!) 95/53      Pulse Readings from Last 1 Encounters:   24 88       Temp Readings from Last 1 Encounters:   25 36.4 °C (97.5 °F) (Oral)          EKG:     Labs:   Lab Results   Component Value Date     2024    K 3.9 2024     2024    CO2 22 2024    BUN 19 2024    CREATININE 0.5 (L) 2024    GLUCOSE 114 (H) 2024         Lab Results   Component Value Date    WBC 8.52 2025    HGB 13.5 2025    HCT 41.2 2025    MCV 97.4 2025     2025          Lab Results   Component Value Date    INR 1.2 2023        PTC NONSTRESS TEST SEMI WKLY W/ AXEL WKLY    Result Date: 2/3/2025  IMPRESSION: The fetal biophysical status is reassuring for both twins. ---------------------------------------------------------------------- RECOMMENDATIONS: Delivery is planned .    PTC NONSTRESS TEST SEMI WKLY W/ AXEL WKLY    Result Date: " 2025  IMPRESSION: The fetal biophysical status is reassuring for both twins. ---------------------------------------------------------------------- RECOMMENDATIONS: Continue planned fetal testing.    PTC NONSTRESS TEST W/ AXEL WKLY    Result Date: 2025  IMPRESSION: Reactive NST x 2. ---------------------------------------------------------------------- RECOMMENDATIONS: Continue planned fetal testing.    PTC NONSTRESS TEST W/ AXEL WKLY    Result Date: 2025  IMPRESSION: The estimated weights of both twins are appropriate for gestational age.  There is no evidence of discordant growth.  Each twin has a normal quantity of amniotic fluid on either side of the dividing membrane. NST is reactive for both twins. Baseline is 130 bpm for Twin A and 125 bpm for Twin B. There were >/= 2 accelerations  seen of 15x15 bpm. No decelerations were noted. Good moderate variability is present. TOCO: No 3 noted, irregular. ---------------------------------------------------------------------- RECOMMENDATIONS: Continue planned  testing.    PTC NONSTRESS TEST W/ AXEL WKLY    Result Date: 2025  IMPRESSION: Fetal biophysical status is reassuring x2. ---------------------------------------------------------------------- RECOMMENDATIONS: Continue planned fetal testing.    PTC NONSTRESS TEST W/ AXEL WKLY    Result Date: 2025  IMPRESSION: The fetal biophysical status is reassuring for both twins. ---------------------------------------------------------------------- RECOMMENDATIONS: Continue planned fetal testing.    [facility] NONSTRESS TEST W/ AXEL WKLY    Result Date: 2025  IMPRESSION: The fetal biophysical status is reassuring for both twins. ---------------------------------------------------------------------- RECOMMENDATIONS: Continue planned fetal testing.    [facility] NONSTRESS TEST W/ AXEL WKLY    Result Date: 2025  IMPRESSION: The fetal biophysical status is reassuring for both twins.  ---------------------------------------------------------------------- RECOMMENDATIONS: Continue planned fetal testing.       No results found for this or any previous visit.       No results found for this or any previous visit.                Lab Results   Component Value Date    LABANTI Negative 02/05/2025    ABO A 02/05/2025    LABRH Positive 02/05/2025    HISTCK Previous type on file 02/06/2025    HISTCK Previous type on file 02/05/2025    HISTCK Previous type on file 02/24/2024        Physical Exam    Airway   Mallampati: II   TM distance: >3 FB   Neck ROM: full  Cardiovascular - normal   Rhythm: regular   Rate: normalPulmonary - normal   clear to auscultation  Dental - normal        Anesthesia Plan    Plan: spinal    Technique: spinal      Airway: natural airway / supplemental oxygen    2 ASA  Blood Products:     Use of Blood Products Discussed: Yes     Consented to blood products  Anesthetic plan and risks discussed with: patient

## 2025-02-07 LAB
ERYTHROCYTE [DISTWIDTH] IN BLOOD BY AUTOMATED COUNT: 14.3 % (ref 11.7–14.4)
HCT VFR BLD AUTO: 35.2 % (ref 35–45)
HGB BLD-MCNC: 11.7 G/DL (ref 11.8–15.7)
MCH RBC QN AUTO: 32.4 PG (ref 28–33.2)
MCHC RBC AUTO-ENTMCNC: 33.2 G/DL (ref 32.2–35.5)
MCV RBC AUTO: 97.5 FL (ref 83–98)
PLATELET # BLD AUTO: 168 K/UL (ref 150–369)
PMV BLD AUTO: 12.8 FL (ref 9.4–12.3)
RBC # BLD AUTO: 3.61 M/UL (ref 3.93–5.22)
WBC # BLD AUTO: 10.75 K/UL (ref 3.8–10.5)

## 2025-02-07 PROCEDURE — 63600000 HC DRUGS/DETAIL CODE: Mod: JZ | Performed by: OBSTETRICS & GYNECOLOGY

## 2025-02-07 PROCEDURE — 85027 COMPLETE CBC AUTOMATED: CPT | Performed by: OBSTETRICS & GYNECOLOGY

## 2025-02-07 PROCEDURE — 63700000 HC SELF-ADMINISTRABLE DRUG: Performed by: OBSTETRICS & GYNECOLOGY

## 2025-02-07 PROCEDURE — 12000000 HC ROOM AND CARE MED/SURG

## 2025-02-07 PROCEDURE — 36415 COLL VENOUS BLD VENIPUNCTURE: CPT | Performed by: OBSTETRICS & GYNECOLOGY

## 2025-02-07 RX ORDER — SIMETHICONE 80 MG
80 TABLET,CHEWABLE ORAL 4 TIMES DAILY PRN
Status: DISCONTINUED | OUTPATIENT
Start: 2025-02-07 | End: 2025-02-09 | Stop reason: HOSPADM

## 2025-02-07 RX ADMIN — PRENATAL VIT W/ FE FUMARATE-FA TAB 27-0.8 MG 1 TABLET: 27-0.8 TAB at 07:42

## 2025-02-07 RX ADMIN — KETOROLAC TROMETHAMINE 30 MG: 30 INJECTION, SOLUTION INTRAMUSCULAR at 02:59

## 2025-02-07 RX ADMIN — ACETAMINOPHEN 975 MG: 325 TABLET ORAL at 04:00

## 2025-02-07 RX ADMIN — SENNOSIDES AND DOCUSATE SODIUM 1 TABLET: 50; 8.6 TABLET ORAL at 07:42

## 2025-02-07 RX ADMIN — KETOROLAC TROMETHAMINE 30 MG: 30 INJECTION, SOLUTION INTRAMUSCULAR at 13:50

## 2025-02-07 RX ADMIN — ALUMINUM HYDROXIDE, MAGNESIUM HYDROXIDE, AND SIMETHICONE 30 ML: 1200; 120; 1200 SUSPENSION ORAL at 19:57

## 2025-02-07 RX ADMIN — ACETAMINOPHEN 975 MG: 325 TABLET ORAL at 23:19

## 2025-02-07 RX ADMIN — SIMETHICONE 80 MG: 80 TABLET, CHEWABLE ORAL at 23:19

## 2025-02-07 RX ADMIN — ACETAMINOPHEN 975 MG: 325 TABLET ORAL at 15:43

## 2025-02-07 RX ADMIN — KETOROLAC TROMETHAMINE 30 MG: 30 INJECTION, SOLUTION INTRAMUSCULAR at 07:42

## 2025-02-07 RX ADMIN — ACETAMINOPHEN 975 MG: 325 TABLET ORAL at 10:01

## 2025-02-07 RX ADMIN — IBUPROFEN 600 MG: 600 TABLET, FILM COATED ORAL at 19:57

## 2025-02-07 RX ADMIN — SENNOSIDES AND DOCUSATE SODIUM 1 TABLET: 50; 8.6 TABLET ORAL at 19:57

## 2025-02-07 NOTE — PROGRESS NOTES
Obstetrics Postpartum Progress Note    Events  No acute events overnight.    Subjective  Pain: yes  Bleeding: lochia minimal  Diet: taking regular diet  Voiding: without difficulty  Bowel: passing flatus  Ambulating: as tolerated    Vitals  Temp:  [36.2 °C (97.1 °F)-36.9 °C (98.5 °F)] 36.3 °C (97.3 °F)  Heart Rate:  [60-85] 73  Resp:  [14-17] 16  BP: ()/(50-58) 100/55    I&O    Intake/Output Summary (Last 24 hours) at 2025 0959  Last data filed at 2025 0313  Gross per 24 hour   Intake 1000 ml   Output 3845 ml   Net -2845 ml       Physical Exam  General: well  Abdomen: soft, nondistended, non-tender  Fundus: firm  Incision: dressing clean, dry, intact  Perineum: deferred  Extremities: symmetric    Labs  Labs Reviewed:  Lab Results   Component Value Date    ABO A 2025    LABRH Positive 2025          Assessment/Plan   Problem-based Assessment and Plan    [facility] is a 39 y.o.  postop day 1 s/p      Kwesi, Dane A [[id number]]   , Low Transverse      Kwesi, Boy B [[id number]]   , Low Transverse.    1. Vital Signs: stable  2. Hemodynamics: stable  3. Pain: controlled  4. VTE Assessment: Early Ambulation  5. Vaccinations/Rhogam: rhogam not indicated   6. Post care: meeting all goals     Janes Moore MD

## 2025-02-07 NOTE — PLAN OF CARE
Problem:  Delivery  Goal: Bleeding is Controlled  2025 by Lisandra Joshi RN  Outcome: Progressing  2025 190 by Lisandra Joshi RN  Outcome: Progressing  Goal: Stable Fetal Wellbeing  2025 by Lisandra Joshi RN  Outcome: Progressing  2025 190 by Lisandra Joshi RN  Outcome: Progressing  Goal: Absence of Infection Signs and Symptoms  2025 by Lisandra Joshi RN  Outcome: Progressing  2025 190 by Lisandra Joshi RN  Outcome: Progressing  Goal: Effective Oxygenation and Ventilation  2025 by Lisandra Joshi RN  Outcome: Progressing  2025 by Lisandra Joshi RN  Outcome: Progressing     Problem: Adult Inpatient Plan of Care  Goal: Plan of Care Review  2025 by Lisandra Joshi RN  Outcome: Progressing  2025 190 by Lisandra Joshi RN  Outcome: Progressing  Goal: Patient-Specific Goal (Individualized)  2025 by Lisandra Joshi RN  Outcome: Progressing  2025 190 by Lisandra Joshi RN  Outcome: Progressing  Goal: Absence of Hospital-Acquired Illness or Injury  2025 by Lisandra Joshi RN  Outcome: Progressing  2025 190 by Lisandra Joshi RN  Outcome: Progressing  Goal: Optimal Comfort and Wellbeing  2025 by Lisandra Joshi RN  Outcome: Progressing  2025 190 by Lisandra Joshi RN  Outcome: Progressing  Goal: Readiness for Transition of Care  2025 by Lisandra Joshi RN  Outcome: Progressing  2025 by Lisandra Joshi RN  Outcome: Progressing     Problem: Postpartum ( Delivery)  Goal: Successful Parent Role Transition  2025 by Lisandra Joshi RN  Outcome: Progressing  2025 190 by Lisandra Joshi RN  Outcome: Progressing  Goal: Hemostasis  2025 0843 by Lisandra Joshi, RN  Outcome: Progressing  2025 1909 by Lisandra Joshi, RN  Outcome: Progressing  Goal: Effective Bowel  Elimination  2/7/2025 0843 by Lisandra Joshi RN  Outcome: Progressing  2/6/2025 1909 by Lisandra Joshi RN  Outcome: Progressing  Goal: Fluid and Electrolyte Balance  2/7/2025 0843 by Lisandra Joshi RN  Outcome: Progressing  2/6/2025 1909 by Lisandra Joshi RN  Outcome: Progressing  Goal: Absence of Infection Signs and Symptoms  2/7/2025 0843 by Lisandra Joshi RN  Outcome: Progressing  2/6/2025 1909 by Lisandra Joshi RN  Outcome: Progressing  Goal: Anesthesia/Sedation Recovery  2/7/2025 0843 by Lisandra Joshi RN  Outcome: Progressing  2/6/2025 1909 by Lisandra Joshi RN  Outcome: Progressing  Goal: Optimal Pain Control and Function  2/7/2025 0843 by Lisandra Joshi RN  Outcome: Progressing  2/6/2025 1909 by Lisandra Joshi RN  Outcome: Progressing  Goal: Nausea and Vomiting Relief  2/7/2025 0843 by Lisandra Joshi RN  Outcome: Progressing  2/6/2025 1909 by Lisandra Joshi RN  Outcome: Progressing  Goal: Effective Urinary Elimination  2/7/2025 0843 by Lisandra Joshi RN  Outcome: Progressing  2/6/2025 1909 by Lisandra Joshi RN  Outcome: Progressing  Goal: Effective Oxygenation and Ventilation  2/7/2025 0843 by Lisandra Joshi RN  Outcome: Progressing  2/6/2025 1909 by Lisandra Joshi RN  Outcome: Progressing     Problem: Breastfeeding  Goal: Effective Breastfeeding  2/7/2025 0843 by Lisandra Joshi RN  Outcome: Progressing  2/6/2025 1909 by Lisandra Joshi RN  Outcome: Progressing

## 2025-02-07 NOTE — PROGRESS NOTES
Patient: Cookieerah Kwesi  Procedure(s):  PRIMARY   Location: [facility] L&[facility] OR    Last vitals:   Vitals:    25 0742   BP: (!) 100/55   Pulse: 73   Resp: 16   Temp: 36.3 °C (97.3 °F)   SpO2: 96%     Level of consciousness: Awake, Alert, Oriented  Post-anesthesia pain: Adequate analgesia  Anesthetic complications: None  Nausea and Vomiting Controled: Yes  Hydration: Adequate  Cardiovascular Function: Stable  Neuro Exam: WNL  Respiration: WNL  Pain is well controlled after spinal preservative free morphine administration and additional IV/PO meds:  Continue 24 hours observation after preservative free morphine administration:

## 2025-02-07 NOTE — PLAN OF CARE
Care Coordination Admission Assessment Note    General Information:  Readmission Within the last 30 days:    Does patient have a :    Patient-Specific Goals (include timeframe):      Living Arrangements:  Arrived From:    Current Living Arrangements:    People in Home:    Home Accessibility:    Living Arrangement Comments: parents will be caregivers    Housing Stability and Utility Access (SDOH):  In the last 12 months, was there a time when you were not able to pay the mortgage or rent on time?: No  In the past 12 months, how many times have you moved?: 0  At any time in the past 12 months, were you homeless or living in a shelter (including now)?: No  In the past 12 months has the electric, gas, oil, or water company threatened to shut off services in your home?: No    Functional Status Prior to Admission:   Assistive Device/Animal Currently Used at Home:    Functional Status Comments:    IADL Comments:       Supports and Services:  Current Outpatient/Agency/Support Group:    Type of Current Home Care Services:    History of home care episode or rehab stay:      Discharge Needs Assessment:   Concerns to be Addressed:    Current Discharge Risk:    Anticipated Changes Related to Illness:      Patient/Family Anticipated Discharge Plan:  Patient/Family Anticipates Transition To: home, home with family  Patient/Family Anticipated Services at Transition: none    Connection to Community  Patient agreeable to referral(s).  Referral(s) made via Connection to Community ([facility]) platform.  See [facility] Patient Profile for specific details.    Patient Choice:   Offered/Gave Vendor List: yes (Mom and dad declined hc services.)  Patient and/or patient guardian/advocate was made aware of their right to choose a provider. A list of eligible providers was presented and reviewed with the patient and/or patient guardian/advocate in written and/or verbal form. The list delineates providers in the patient’s desired geographic  area who are participating in the Medicare program and/or providers contracted with the patient’s primary insurance. The Medicare list and quality ratings were obtained from the Medicare.gov [medicare.gov] website.    Anticipated Discharge Plan:  Met with patient. Provided education and contact information for Care Coordination services.: yes  Anticipated Discharge Disposition: home with assistance     Transportation Needs (SDOH):  Transportation Concerns: none  Transportation Anticipated: family or friend will provide  Is Out of Hospital DNR needed at discharge?:      In the past 12 months, has lack of transportation kept you from medical appointments or from getting medications?: No  In the past 12 months, has lack of transportation kept you from meetings, work, or from getting things needed for daily living?: No    Concerns - comments: Reason for consultation: SW consulted with mom for a Maternity check in. SW introduced self to parent(s) and explained SW`s role. SW obtained consent to speak with Parents at the time of consult. SW verified all demographic information and insurance. Home/Supports : Mother and Father live together. Mother has support from spouse and family. Mom denies any safety concerns in her home or with her intimate relationship. SW encouraged parents to ask for help from their support system as mom is healing from giving birth. Insurance: SW provided education about how to add  to insurance. Supplies: No issues with supplies. Pediatrician: No barriers with picking a pediatrician or getting baby to the pediatrician. Mental health: SW provided education on Postpartum Depression/Anxiety, PPD/Anxiety resources, and SW`s contact information. Mom was given the Postpartum Depression/Anxiety packet with mental health providers. Mother and Father understood PP Depression/Anxiety symptoms. [employer] Support group information given to mom and dad. Mom was encouraged to  obtain a therapist if needed sooner instead of later. Mom informed of how to obtain emergency services if needed due to mental health if needed. Mom reports baseline mood at this time. Father reports they are doing  well and are very prepared for the twins. Alcohol/Drug use: No current alcohol or drug use. No concerns. Support/bonding: SW provided emotional support to Mother and Father. SW asked parents to call SW with any issues, concerns or for support. No concerns about bonding with baby per staff. Resources/services- (including home care) Parents denied the need for any additional resources/services for d/c. Mom and dad declined hc services.  No current social barriers to d/c.

## 2025-02-07 NOTE — PLAN OF CARE
Problem: Adult Inpatient Plan of Care  Goal: Plan of Care Review  Outcome: Progressing  Flowsheets (Taken 2/7/2025 2883)  Progress: improving  Outcome Evaluation:   VSS   bleeding wnl   oob voiding independently   pain controlled with tylenol and toradol   dressing in place   breastfeeding and supplementing with formula  Plan of Care Reviewed With: patient   Plan of Care Review  Plan of Care Reviewed With: patient  Progress: improving  Outcome Evaluation: VSS; bleeding wnl; oob voiding independently; pain controlled with tylenol and toradol; dressing in place; breastfeeding and supplementing with formula

## 2025-02-08 PROCEDURE — 63700000 HC SELF-ADMINISTRABLE DRUG: Performed by: OBSTETRICS & GYNECOLOGY

## 2025-02-08 PROCEDURE — 12000000 HC ROOM AND CARE MED/SURG

## 2025-02-08 RX ORDER — POLYETHYLENE GLYCOL 3350 17 G/17G
17 POWDER, FOR SOLUTION ORAL 2 TIMES DAILY
Status: DISCONTINUED | OUTPATIENT
Start: 2025-02-08 | End: 2025-02-09 | Stop reason: HOSPADM

## 2025-02-08 RX ADMIN — SIMETHICONE 80 MG: 80 TABLET, CHEWABLE ORAL at 22:47

## 2025-02-08 RX ADMIN — ACETAMINOPHEN 975 MG: 325 TABLET ORAL at 04:40

## 2025-02-08 RX ADMIN — POLYETHYLENE GLYCOL 3350 17 G: 17 POWDER, FOR SOLUTION ORAL at 15:37

## 2025-02-08 RX ADMIN — SENNOSIDES AND DOCUSATE SODIUM 1 TABLET: 50; 8.6 TABLET ORAL at 20:13

## 2025-02-08 RX ADMIN — SIMETHICONE 80 MG: 80 TABLET, CHEWABLE ORAL at 15:25

## 2025-02-08 RX ADMIN — ACETAMINOPHEN 975 MG: 325 TABLET ORAL at 16:55

## 2025-02-08 RX ADMIN — ACETAMINOPHEN 975 MG: 325 TABLET ORAL at 22:43

## 2025-02-08 RX ADMIN — PRENATAL VIT W/ FE FUMARATE-FA TAB 27-0.8 MG 1 TABLET: 27-0.8 TAB at 07:58

## 2025-02-08 RX ADMIN — IBUPROFEN 600 MG: 600 TABLET, FILM COATED ORAL at 07:58

## 2025-02-08 RX ADMIN — SENNOSIDES AND DOCUSATE SODIUM 1 TABLET: 50; 8.6 TABLET ORAL at 07:58

## 2025-02-08 RX ADMIN — IBUPROFEN 600 MG: 600 TABLET, FILM COATED ORAL at 02:17

## 2025-02-08 RX ADMIN — POLYETHYLENE GLYCOL 3350 17 G: 17 POWDER, FOR SOLUTION ORAL at 20:13

## 2025-02-08 RX ADMIN — ACETAMINOPHEN 975 MG: 325 TABLET ORAL at 10:39

## 2025-02-08 RX ADMIN — SIMETHICONE 80 MG: 80 TABLET, CHEWABLE ORAL at 08:05

## 2025-02-08 RX ADMIN — IBUPROFEN 600 MG: 600 TABLET, FILM COATED ORAL at 15:25

## 2025-02-08 RX ADMIN — IBUPROFEN 600 MG: 600 TABLET, FILM COATED ORAL at 21:31

## 2025-02-08 NOTE — LACTATION NOTE
PT continues to bottle feed twins, latching intermittently. Her breasts are filling and she has started leaking transitional milk, asking to start pumping. Set up symphony pump, settings reviewed. Discussed importance of regular and frequent stimulation to maintain milk production. Offered support as needed.

## 2025-02-08 NOTE — LACTATION NOTE
PT with DD twins, minimal success with BF thus far. Both babies being supplemented with formula. LC in to assist.     Baby A was alert and rooting. PT agreeable to latch assist. Demonstrated breast compression and how to bring baby onto the nipple. He was able to latch briefly but would not maintain. After several minutes, PT opting to supplement.     Baby B was spitty upon arrival, after settling attempted bottle feed. Took 3mls but refused further intake. Parents encouraged to offer again in 20-30mins or when cues are present.     Discussed appropriate volumes and recommended pumping for stimulation, PT declines at present. She is aware to call for assist as needed.

## 2025-02-08 NOTE — PROGRESS NOTES
"POST PARTUM NOTE    POD#2    S:  Patient seen and examined.  The patient requesting meds for bowel regimen.  Pain is well controlled with Motrin and Oxycodone  Tolerated general diet.  Denies n/v/d. +flatus/-bowel movement.  +ambulation.  Lochia decreasing.  Breastfeeding infant well.      O:  Visit Vitals  BP (!) 104/56 (BP Location: Left upper arm, Patient Position: Sitting)   Pulse 78   Temp 36.5 °C (97.7 °F) (Oral)   Resp 16   Ht 1.676 m (5' 6\")   Wt 85.3 kg (188 lb)   SpO2 100%   Breastfeeding Yes   BMI 30.34 kg/m²     Physical Exam:  AAOx3, NAD  CV:RR  L: CTA bilaterally  Abd: soft, appropriately tender to palpation, fundus firm below umbilicus  Incision: c/d/i  Ex: No edema, non-tender, no cyanosis    Lab Results   Component Value Date    WBC 10.75 (H) 2025    HGB 11.7 (L) 2025    HCT 35.2 2025    MCV 97.5 2025     2025         A/P: 39 y.o.  s/p  POD#2    FEN: GD,HLIV  HEME: normal  PAIN: Oxycodone, Tylenol and Motrin  GI:Colace PRN  Gu: voiding  PPX: Ambulation encouraged  DISPO: continue routine care    Carlene Rodrigez MD  Obstetrics and Gynecology  [facility]  Office phone # [phone]  In hospital pager # [phone]       "

## 2025-02-09 VITALS
HEART RATE: 78 BPM | HEIGHT: 66 IN | OXYGEN SATURATION: 99 % | DIASTOLIC BLOOD PRESSURE: 57 MMHG | SYSTOLIC BLOOD PRESSURE: 106 MMHG | TEMPERATURE: 97.5 F | RESPIRATION RATE: 16 BRPM | BODY MASS INDEX: 30.22 KG/M2 | WEIGHT: 188 LBS

## 2025-02-09 PROCEDURE — 63700000 HC SELF-ADMINISTRABLE DRUG: Performed by: OBSTETRICS & GYNECOLOGY

## 2025-02-09 RX ORDER — POLYETHYLENE GLYCOL 3350 17 G/17G
17 POWDER, FOR SOLUTION ORAL 2 TIMES DAILY
Qty: 14 PACKET | Refills: 0 | Status: SHIPPED | OUTPATIENT
Start: 2025-02-09 | End: 2025-02-16

## 2025-02-09 RX ORDER — IBUPROFEN 600 MG/1
600 TABLET ORAL EVERY 6 HOURS PRN
Qty: 40 TABLET | Refills: 0 | Status: SHIPPED | OUTPATIENT
Start: 2025-02-09 | End: 2025-02-19

## 2025-02-09 RX ORDER — AMOXICILLIN 250 MG
1 CAPSULE ORAL 2 TIMES DAILY
Qty: 60 TABLET | Refills: 0 | Status: SHIPPED | OUTPATIENT
Start: 2025-02-09 | End: 2025-03-11

## 2025-02-09 RX ORDER — OXYCODONE HYDROCHLORIDE 5 MG/1
5 TABLET ORAL EVERY 4 HOURS PRN
Qty: 5 TABLET | Refills: 0 | Status: SHIPPED | OUTPATIENT
Start: 2025-02-09 | End: 2025-02-14

## 2025-02-09 RX ORDER — ACETAMINOPHEN 325 MG/1
975 TABLET ORAL
Qty: 360 TABLET | Refills: 0 | Status: SHIPPED | OUTPATIENT
Start: 2025-02-09 | End: 2025-03-11

## 2025-02-09 RX ORDER — AMOXICILLIN 250 MG
1 CAPSULE ORAL 2 TIMES DAILY
Qty: 60 TABLET | Refills: 0 | Status: SHIPPED | OUTPATIENT
Start: 2025-02-09 | End: 2025-02-09

## 2025-02-09 RX ADMIN — ACETAMINOPHEN 975 MG: 325 TABLET ORAL at 08:36

## 2025-02-09 RX ADMIN — SENNOSIDES AND DOCUSATE SODIUM 1 TABLET: 50; 8.6 TABLET ORAL at 08:36

## 2025-02-09 RX ADMIN — PRENATAL VIT W/ FE FUMARATE-FA TAB 27-0.8 MG 1 TABLET: 27-0.8 TAB at 08:36

## 2025-02-09 RX ADMIN — IBUPROFEN 600 MG: 600 TABLET, FILM COATED ORAL at 08:36

## 2025-02-09 RX ADMIN — SIMETHICONE 80 MG: 80 TABLET, CHEWABLE ORAL at 06:36

## 2025-02-09 RX ADMIN — POLYETHYLENE GLYCOL 3350 17 G: 17 POWDER, FOR SOLUTION ORAL at 08:36

## 2025-02-09 NOTE — LACTATION NOTE
PT is doing her own form of triple feeding with the twins. Reviewed all feeding education. PT states her breast are starting to feel hard. Discussed importance of breastfeeding or pumping every 3 hours to help with stimulation and milk production. Educated on what can happen if pt skips pumps or nursing sessions. States she has a pump for home. Showed pt lactation resource paper and encouraged follow up after discharge. Verbalized understanding.

## 2025-02-09 NOTE — DISCHARGE INSTRUCTIONS
POSTPARTUM DISCHARGE INSTRUCTIONS      If you had a vaginal delivery: Call for postpartum  appointment with any of our physicians in 6wks.   If you had a  Section: call for a 6 weeks postpartum visit.     Activities/Restrictions:    -No driving for 7-14 days.  No driving if taking Norco for pain.   -No baths or swimming for 6 weeks.  Showers only.   -No tampons, douching, intercourse for 6 weeks   -No heavy lifting more than the baby for 6 weeks.    -No heavy exercise for 6 weeks.      Medications:   -Please resume prenatal vitamins if you are breast feeding  -Colace 100mg take one tablet by mouth daily as needed for constipation. This is an over-the-counter medication.   -For hemorrhoids, use Tucks pads, Preparation H, Proctofoam as needed.  -For cracked/sore nipples you may use Lanolin cream.    Please resume your general diet.     To help with and episiotomy or vaginal laceration disomfort, you may:  1. Apply cold packs or chilled witch-sherri pads to the area  2. Take sitz baths (soaking in a few inches of warm water will help)  3. Use over the counter Dermaplast spray  4. Apply warm water to the area after urination using a squeeze water bottle  5. Always wipe from front to back to prevent infection    If you had a  section:  1. Keep you incision clean and dry.  2. You may let the water run over your incision in the shower but do not directly scrub the incision.  3. Do not apply creams or lotions to the incision  4. Your steri-strips may fall off on their own, otherwise remove them in the shower after 7 days    Please call the office if you have:  1. Heavy vaginal bleeding (soaking more than one pad per hour or passing clots larger than an egg)  2. Increasing redness or swelling at or near your incision or episiotomy site  3. Inability to tolerate food or drink without vomiting  4. Opening, bleeding, discharge or separation of your incision or episiotomy site  5. Foul smelling discharge  6.  "Chills or fever over 100.4 degrees Fahrenheit  7. Increasing pain (not relieved by pain medication)  8. Headache unrelieved by \"pain meds\"  9. New calf pain especially if only on one side  10. Unrelieved feelings of:  Inability to cope  Sadness  Anxiety  Lack of interest in baby  Insomnia  Crying   "

## 2025-02-09 NOTE — DISCHARGE SUMMARY
Obstetrical Discharge Form          Date of Delivery:      Dane Orosco [[id number]]   2025      Dane Orosco [[id number]]   t      Dane Orosco [[id number]]   11:16 AM      Dane Orosco [[id number]]   11:17 AM    Gestational Age:38w0d    Delivered By:      KwesiDane [[id number]]   Daphney PerryDane fisher [[id number]]   Daphney Villeda    Delivery Type: primary  section, low transverse incision    Antepartum complications: Dichorionic diamniotic twins    Baby: Liveborn      Dane Orosco A [[id number]]   male      Dane Orosco [[id number]]   male, Apgars      Dane Orosco [[id number]]   9      KwesiDane [[id number]]   9  /     Dane Orosco [[id number]]   9      Dane Orosco [[id number]]   9  ,      Dane Orosco [[id number]]   2.954 kg (6 lb 8.2 oz)      Dane Orosco [[id number]]   2.71 kg (5 lb 15.6 oz)    Anesthesia:      Dane Orosco A [[id number]]   Spinal      Dane Orosco [[id number]]   Spinal    Intrapartum complications: None    Laceration:      Dane Orosco A [[id number]]         Dane Orosco [[id number]]        Feeding method: breast    Rh Immune globulin given: not applicable    Discharge Date: 2025      Plan:    Follow-up appointment with your doctors in 6 weeks, please call for an appointment

## 2025-02-09 NOTE — PROGRESS NOTES
"POST PARTUM NOTE    S:  POD# 3 s/p CD. The patient is doing well with no complaints at this time.  She is tolerating a regular diet, + flatus, ambulating well, urinating without difficulty, and has adequate analgesia. Denies n/v/d, fever/chills. Lochia normal.    O:  Visit Vitals  BP (!) 106/57   Pulse 78   Temp 36.4 °C (97.5 °F) (Oral)   Resp 16   Ht 1.676 m (5' 6\")   Wt 85.3 kg (188 lb)   SpO2 99%   Breastfeeding Yes   BMI 30.34 kg/m²       Physical Exam:  General: AAOx3, NAD  CV: RR  Lungs: CTA bilaterally  Abdomen: soft, appropriately tender to palpation, fundus firm below umbilicus, incision clean/dry/intact, no erythema or discharge  Ex: 1+ bilateral lower extremity edema, symmetric and non-tender, no cyanosis    Lab Results   Component Value Date    WBC 10.75 (H) 2025    HGB 11.7 (L) 2025    HCT 35.2 2025    MCV 97.5 2025     2025         A/P: 39 y.o.  s/p  POD#3    POD#3 s/p CD: Doing well, ambulation encouraged today. Continue routine postop orders.  Boy x 2/Breast  Disposition: plan for d/c to home today    Chiara Castillo MD  10:49 AM, 2025  [facility]  Office: [phone]    "

## 2025-02-10 LAB
CASE RPRT: NORMAL
CLINICAL INFO: NORMAL
PATH REPORT.FINAL DX SPEC: NORMAL
PATH REPORT.GROSS SPEC: NORMAL

## (undated) DEVICE — GOWN SIRUS FABRNF RAGLAN XL ST 28/CS

## (undated) DEVICE — SACK GAUZE

## (undated) DEVICE — SPONGE LAP 18X18 SAFE-T RFID ENHANCED XRAY

## (undated) DEVICE — TUBING SMOKE EVAC PENCIL COATED

## (undated) DEVICE — PAD GROUND ELECTROSURGICAL W/CORD

## (undated) DEVICE — SUTURE BIOSYN 4-0 UNDYED 1X18 P-12

## (undated) DEVICE — CONTAINER SPECIMEN STERILE 5OZ

## (undated) DEVICE — SUTURE PLAIN GUT 2-0, UNDYED 30" GS-25

## (undated) DEVICE — GAUZE 8X4 16 PLY RFID DOUBLE XRAY

## (undated) DEVICE — SUTURE POLYSORB 0 UNDYED 1X30 GS-21

## (undated) DEVICE — Device

## (undated) DEVICE — TRAY WET SKIN PREP PREMIUM

## (undated) DEVICE — PARTICLES HEMOSTATIC ARISTA 1 GRAM

## (undated) DEVICE — BLANKET UPPER BODY BAIR HUGGER

## (undated) DEVICE — COVER LIGHTHANDLE (STERILE SINGLE PA

## (undated) DEVICE — TOWEL SURGICAL W17XL27IN BLUE COTTON STANDARD PREWASHED DELI

## (undated) DEVICE — TUBING D

## (undated) DEVICE — SUTURE PLAIN 2-0 GUT UNDYED 1X30 GS-21

## (undated) DEVICE — PACK RFID MLH DELIVERY STANDARDIZED

## (undated) DEVICE — SOLN IRRIG .9%SOD 500ML

## (undated) DEVICE — TUBING DE SMALL FOR 8-14MM VACURETTE

## (undated) DEVICE — APPLICATOR CHLORAPREP 26ML ORANGE TINT

## (undated) DEVICE — DRESSING TELFA 3X8

## (undated) DEVICE — MAT FLOOR QUICK WICK 30X40

## (undated) DEVICE — FILTER DE BERKELEY

## (undated) DEVICE — GLOVE SZ 8 PROTEXIS PI

## (undated) DEVICE — CANISTER DE BERKELEY W/TISSUETRAP

## (undated) DEVICE — PACK LITHOTOMY PK III SIRUS 10/CS

## (undated) DEVICE — SUTURE POLYSORB 0 VIOLET 1X36 GS-25

## (undated) DEVICE — CANISTER W/TOP DE BERKELEY LG

## (undated) DEVICE — BULB SYRINGE IRRIGATION STERILE

## (undated) DEVICE — PAD PREPPING CUFFED 24X48 NS 100/CS

## (undated) DEVICE — BETADINE SOLUTION 8OZ BT